# Patient Record
Sex: FEMALE | Race: BLACK OR AFRICAN AMERICAN | NOT HISPANIC OR LATINO | Employment: PART TIME | URBAN - METROPOLITAN AREA
[De-identification: names, ages, dates, MRNs, and addresses within clinical notes are randomized per-mention and may not be internally consistent; named-entity substitution may affect disease eponyms.]

---

## 2020-12-22 ENCOUNTER — HISTORICAL (OUTPATIENT)
Dept: ADMINISTRATIVE | Facility: HOSPITAL | Age: 25
End: 2020-12-22

## 2020-12-22 LAB
GLUCOSE 1H P 100 G GLC PO SERPL-MCNC: 144 MG/DL (ref 100–180)
HCT VFR BLD AUTO: 32.6 % (ref 37–47)
HGB BLD-MCNC: 10.4 GM/DL (ref 12–16)

## 2021-01-15 ENCOUNTER — HISTORICAL (OUTPATIENT)
Dept: ADMINISTRATIVE | Facility: HOSPITAL | Age: 26
End: 2021-01-15

## 2021-01-15 LAB
GLUCOSE 1H P 100 G GLC PO SERPL-MCNC: 152 MG/DL (ref 100–180)
GLUCOSE 2H P 100 G GLC PO SERPL-MCNC: 121 MG/DL (ref 70–140)
GLUCOSE 3H P 100 G GLC PO SERPL-MCNC: 85 MG/DL (ref 70–115)
GLUCOSE BS SERPL-MCNC: 72 MG/DL (ref 70–115)

## 2021-12-28 ENCOUNTER — HISTORICAL (OUTPATIENT)
Dept: ADMINISTRATIVE | Facility: HOSPITAL | Age: 26
End: 2021-12-28

## 2021-12-28 LAB
FLUAV AG UPPER RESP QL IA.RAPID: NEGATIVE
FLUBV AG UPPER RESP QL IA.RAPID: NEGATIVE
SARS-COV-2 RNA RESP QL NAA+PROBE: NEGATIVE

## 2022-04-11 ENCOUNTER — HISTORICAL (OUTPATIENT)
Dept: ADMINISTRATIVE | Facility: HOSPITAL | Age: 27
End: 2022-04-11

## 2022-04-27 VITALS
WEIGHT: 137.56 LBS | DIASTOLIC BLOOD PRESSURE: 75 MMHG | OXYGEN SATURATION: 100 % | BODY MASS INDEX: 20.85 KG/M2 | SYSTOLIC BLOOD PRESSURE: 108 MMHG | HEIGHT: 68 IN

## 2022-06-08 ENCOUNTER — OFFICE VISIT (OUTPATIENT)
Dept: GYNECOLOGY | Facility: CLINIC | Age: 27
End: 2022-06-08
Payer: MEDICAID

## 2022-06-08 VITALS
HEART RATE: 98 BPM | RESPIRATION RATE: 18 BRPM | HEIGHT: 68 IN | WEIGHT: 130.06 LBS | TEMPERATURE: 98 F | DIASTOLIC BLOOD PRESSURE: 75 MMHG | OXYGEN SATURATION: 98 % | SYSTOLIC BLOOD PRESSURE: 102 MMHG | BODY MASS INDEX: 19.71 KG/M2

## 2022-06-08 DIAGNOSIS — N93.9 ABNORMAL UTERINE BLEEDING: ICD-10-CM

## 2022-06-08 DIAGNOSIS — Z11.3 SCREEN FOR STD (SEXUALLY TRANSMITTED DISEASE): ICD-10-CM

## 2022-06-08 DIAGNOSIS — F43.9 STRESS AT HOME: Primary | ICD-10-CM

## 2022-06-08 LAB
C TRACH DNA SPEC QL NAA+PROBE: NOT DETECTED
N GONORRHOEA DNA SPEC QL NAA+PROBE: NOT DETECTED

## 2022-06-08 PROCEDURE — 87491 CHLMYD TRACH DNA AMP PROBE: CPT

## 2022-06-08 PROCEDURE — 99213 OFFICE O/P EST LOW 20 MIN: CPT | Mod: PBBFAC

## 2022-06-08 PROCEDURE — 87625 HPV TYPES 16 & 18 ONLY: CPT

## 2022-06-08 PROCEDURE — 87591 N.GONORRHOEAE DNA AMP PROB: CPT

## 2022-06-08 NOTE — PROGRESS NOTES
"    [facility] GYNECOLOGY CLINIC NOTE     Salome Pérez is a 26 y.o.  presenting to GYN clinic for Abnormal Uterine Bleeding x 1 month .      27 yo stated having abnormal uterine bleeding . LMP started on 05/10/22. Stated having bleed almost every day. No abnormal vaginal discharge. Right lower quadrant pain associated with period 1 x. Described as cramp like pain, which has resolved. Last pap smear was normal about 2 years ago. Denies dysuria. She is under a lot of stress currently. Taking care of her son and dealing with personal issues at home. Denies guilt, sadness, or loss of interest in daily activities.     Menarche at 14 yo  Gestational hx: 1 x pregnancy, via Vaginal delivery  No hx of birth control . Denies hx of STD's.  Sexually active with one male partner    PHQ-2 : negative    Gynecology  OB History        1    Para   1    Term           1    AB        Living   1       SAB        IAB        Ectopic        Multiple        Live Births   1                Past Medical History:   Diagnosis Date    Toxemia in pregnancy       History reviewed. No pertinent surgical history.   No current outpatient medications  Social History     Tobacco Use    Smoking status: Never Smoker    Smokeless tobacco: Current User   Substance Use Topics    Alcohol use: Yes     Comment: occassionally    Drug use: Never       Review of Systems  Pertinent items are noted in HPI.     Objective:     /75 (BP Location: Right arm)   Pulse 98   Temp 98.2 °F (36.8 °C) (Oral)   Resp 18   Ht 5' 8" (1.727 m)   Wt 59 kg (130 lb 1.1 oz)   LMP 05/15/2022 (Approximate)   SpO2 98%   BMI 19.78 kg/m²   Physical Exam:  Gen: Alert, cooperative, in no acute distress.  HEENT: EOMI, MMM  CV: rrr, no murmurs/rubs/gallops  Chest: ctabl, no wheezes/rales/rhonchi  Abdomen: Soft, non-tender, no masses.  Extrem: Extremities normal  External genitalia: Normal female genetalia without lesion, discharge or tenderness. "   Speculum Exam: Vaginal vault without discharge, nonodorous, no lesions/masses seen.  Mild bleeding secondary to menstrual cycle- still bleeding. Cervical os visualized as closed, no lesions/masses.   Bimanual Exam: No cervical motion tenderness.  Uterus freely mobile.  Normal size uterus. No adnexal masses.  Nontender exam.   Note: RN chaperone present for entirety of exam.    Relevant Labs:   none    Relevant Imaging:  none      Assessment:       26 y.o.  here for  Abnormal uterine bleeding.  1. Stress at home     2. Abnormal uterine bleeding  Chlamydia/GC, PCR    HIV 1/2 Ag/Ab (4th Gen)    SYPHILIS ANTIBODY (WITH REFLEX RPR)    Liquid-Based Pap Smear, Screening Screening   3. Screen for STD (sexually transmitted disease)          Plan:   - Pelvic exam performed. Pap smear, GC, HIV, Syphilis ordered for STD screening  -Urine pregnancy test is negative.  - Further imaging is not necessary currently as patient has no pertinent pelvic exam and this is 1 x occurrence of abnormal uterine bleeding.  - Discussed with the patient about dealing with stress at home . And stress could likely be contributing to her abnormal menstrual cycle for a month. Ovarian cyst/rupture could be another reason for abdominal pain last month.  - Offered birth control options, patient understood but declines contraception.  - She will return in a year with NP for Well Women's Exam       Problem List Items Addressed This Visit    None          Return to clinic PRN    Discussed patient and plan with NP follow up for 1 year Women's Wellness Exam

## 2022-06-15 LAB
HIGH RISK HPV 16 (PRECISION): NEGATIVE
HIGH RISK HPV 18/45 (PRECISION): NEGATIVE
INSULIN SERPL-ACNC: NORMAL U[IU]/ML
LAB AP BETHESDA CATEGORY: NORMAL
LAB AP GYN ADDITIONAL FINDINGS: NORMAL
LAB AP GYN MOLECULAR TESTING: NORMAL
LAB AP LMP DATE: NORMAL
LAB AP OCHS PAP SPECIMEN ADEQUACY: NORMAL
LAB AP OHS PAP INTERPRETATION: NORMAL
LAB AP PAP DISCLAIMER COMMENTS: NORMAL

## 2022-08-04 ENCOUNTER — HOSPITAL ENCOUNTER (EMERGENCY)
Facility: HOSPITAL | Age: 27
Discharge: HOME | End: 2022-08-04
Attending: EMERGENCY MEDICINE

## 2022-08-04 VITALS
TEMPERATURE: 99 F | WEIGHT: 139.56 LBS | SYSTOLIC BLOOD PRESSURE: 135 MMHG | DIASTOLIC BLOOD PRESSURE: 76 MMHG | RESPIRATION RATE: 17 BRPM | BODY MASS INDEX: 21.15 KG/M2 | HEIGHT: 68 IN | HEART RATE: 77 BPM | OXYGEN SATURATION: 100 %

## 2022-08-04 DIAGNOSIS — D64.9 ANEMIA, UNSPECIFIED TYPE: Primary | ICD-10-CM

## 2022-08-04 DIAGNOSIS — N93.9 ABNORMAL UTERINE BLEEDING: ICD-10-CM

## 2022-08-04 LAB
ALBUMIN SERPL-MCNC: 3.9 GM/DL (ref 3.5–5)
ALBUMIN/GLOB SERPL: 1.2 RATIO (ref 1.1–2)
ALP SERPL-CCNC: 50 UNIT/L (ref 40–150)
ALT SERPL-CCNC: 9 UNIT/L (ref 0–55)
AST SERPL-CCNC: 14 UNIT/L (ref 5–34)
B-HCG UR QL: NEGATIVE
BASOPHILS # BLD AUTO: 0.04 X10(3)/MCL (ref 0–0.2)
BASOPHILS NFR BLD AUTO: 0.6 %
BILIRUBIN DIRECT+TOT PNL SERPL-MCNC: 0.2 MG/DL
BUN SERPL-MCNC: 12.4 MG/DL (ref 7–18.7)
CALCIUM SERPL-MCNC: 9.5 MG/DL (ref 8.4–10.2)
CHLORIDE SERPL-SCNC: 107 MMOL/L (ref 98–107)
CO2 SERPL-SCNC: 27 MMOL/L (ref 22–29)
CREAT SERPL-MCNC: 0.69 MG/DL (ref 0.55–1.02)
CTP QC/QA: YES
EOSINOPHIL # BLD AUTO: 0.07 X10(3)/MCL (ref 0–0.9)
EOSINOPHIL NFR BLD AUTO: 1.1 %
ERYTHROCYTE [DISTWIDTH] IN BLOOD BY AUTOMATED COUNT: 12.3 % (ref 11.5–17)
GLOBULIN SER-MCNC: 3.2 GM/DL (ref 2.4–3.5)
GLUCOSE SERPL-MCNC: 107 MG/DL (ref 74–100)
HCT VFR BLD AUTO: 35.4 % (ref 37–47)
HGB BLD-MCNC: 11.6 GM/DL (ref 12–16)
IMM GRANULOCYTES # BLD AUTO: 0.01 X10(3)/MCL (ref 0–0.04)
IMM GRANULOCYTES NFR BLD AUTO: 0.2 %
LYMPHOCYTES # BLD AUTO: 2.93 X10(3)/MCL (ref 0.6–4.6)
LYMPHOCYTES NFR BLD AUTO: 46.9 %
MCH RBC QN AUTO: 27.7 PG (ref 27–31)
MCHC RBC AUTO-ENTMCNC: 32.8 MG/DL (ref 33–36)
MCV RBC AUTO: 84.5 FL (ref 80–94)
MONOCYTES # BLD AUTO: 0.53 X10(3)/MCL (ref 0.1–1.3)
MONOCYTES NFR BLD AUTO: 8.5 %
NEUTROPHILS # BLD AUTO: 2.7 X10(3)/MCL (ref 2.1–9.2)
NEUTROPHILS NFR BLD AUTO: 42.7 %
NRBC BLD AUTO-RTO: 0 %
PLATELET # BLD AUTO: 224 X10(3)/MCL (ref 130–400)
PMV BLD AUTO: 11 FL (ref 7.4–10.4)
POTASSIUM SERPL-SCNC: 4.2 MMOL/L (ref 3.5–5.1)
PROT SERPL-MCNC: 7.1 GM/DL (ref 6.4–8.3)
RBC # BLD AUTO: 4.19 X10(6)/MCL (ref 4.2–5.4)
SODIUM SERPL-SCNC: 139 MMOL/L (ref 136–145)
WBC # SPEC AUTO: 6.3 X10(3)/MCL (ref 4.5–11.5)

## 2022-08-04 PROCEDURE — 99283 EMERGENCY DEPT VISIT LOW MDM: CPT | Mod: 25

## 2022-08-04 PROCEDURE — 80053 COMPREHEN METABOLIC PANEL: CPT | Performed by: EMERGENCY MEDICINE

## 2022-08-04 PROCEDURE — 81025 URINE PREGNANCY TEST: CPT | Performed by: EMERGENCY MEDICINE

## 2022-08-04 PROCEDURE — 36415 COLL VENOUS BLD VENIPUNCTURE: CPT | Performed by: EMERGENCY MEDICINE

## 2022-08-04 PROCEDURE — 85025 COMPLETE CBC W/AUTO DIFF WBC: CPT | Performed by: EMERGENCY MEDICINE

## 2022-08-04 NOTE — Clinical Note
"Salome Finky" Beto was seen and treated in our emergency department on 8/4/2022.  She may return to work on 08/05/2022.       If you have any questions or concerns, please don't hesitate to call.      i[employer] CURI[facility]    "

## 2022-08-04 NOTE — ED PROVIDER NOTES
Encounter Date: 8/4/2022       History     Chief Complaint   Patient presents with    Weakness     Reports lumps on uterus and fatigue x4 days.      Vaginal bleeding; intermittently for several months. Has been seen by gynecology and is hesitant to accept OCP's. Is inclined toward possible myomectomy for fibroids. Patient essentially requesting links to gynecology, seeking reassurance that no hemomdynamically significant bleed has yet evolved given ongoing spotting, menorrhagia.    The history is provided by the patient.   Female  Problem  Primary symptoms include vaginal bleeding.   This is a recurrent problem. The current episode started several weeks ago. The problem occurs every several days. The problem has been waxing and waning. The symptoms occur spontaneously. She is not pregnant. Her LMP was weeks ago. The patient's menstrual history has been irregular. The discharge was bloody. Associated medical issues do not include STD, PID, vaginosis, gynecological surgery, ectopic pregnancy or terminated pregnancy.     Review of patient's allergies indicates:  No Known Allergies  Past Medical History:   Diagnosis Date    Toxemia in pregnancy      History reviewed. No pertinent surgical history.  Family History   Problem Relation Age of Onset    Cancer Paternal Grandmother     Hypertension Father     Diabetes Father      Social History     Tobacco Use    Smoking status: Never Smoker    Smokeless tobacco: Current User   Substance Use Topics    Alcohol use: Yes     Comment: occassionally    Drug use: Never     Review of Systems   Genitourinary: Positive for vaginal bleeding.   All other systems reviewed and are negative.      Physical Exam     Initial Vitals [08/04/22 0203]   BP Pulse Resp Temp SpO2   127/86 83 18 98.6 °F (37 °C) 99 %      MAP       --         Physical Exam    Nursing note and vitals reviewed.  Constitutional: She appears well-developed and well-nourished. She is not diaphoretic. No distress.    HENT:   Head: Normocephalic and atraumatic.   Eyes: EOM are normal. Pupils are equal, round, and reactive to light. Right eye exhibits no discharge. Left eye exhibits no discharge.   Neck: Neck supple. No thyromegaly present. No tracheal deviation present. No JVD present.   Normal range of motion.  Cardiovascular: Normal rate, regular rhythm, normal heart sounds and intact distal pulses. Exam reveals no gallop and no friction rub.    No murmur heard.  Pulmonary/Chest: Breath sounds normal. No stridor. No respiratory distress. She has no wheezes. She has no rhonchi. She has no rales.   Abdominal: Abdomen is soft. Bowel sounds are normal. She exhibits no distension and no mass. There is no abdominal tenderness. There is no rebound and no guarding.   Musculoskeletal:         General: No tenderness or edema. Normal range of motion.      Cervical back: Normal range of motion and neck supple.     Lymphadenopathy:     She has no cervical adenopathy.   Neurological: She is alert and oriented to person, place, and time. She has normal strength. She displays normal reflexes. No cranial nerve deficit or sensory deficit. GCS score is 15. GCS eye subscore is 4. GCS verbal subscore is 5. GCS motor subscore is 6.   Skin: Skin is warm and dry. Capillary refill takes less than 2 seconds. No rash and no abscess noted. No erythema. No pallor.   Psychiatric: She has a normal mood and affect. Her behavior is normal. Judgment and thought content normal.         ED Course   Procedures  Labs Reviewed   COMPREHENSIVE METABOLIC PANEL - Abnormal; Notable for the following components:       Result Value    Glucose Level 107 (*)     All other components within normal limits   CBC WITH DIFFERENTIAL - Abnormal; Notable for the following components:    RBC 4.19 (*)     Hgb 11.6 (*)     Hct 35.4 (*)     MCHC 32.8 (*)     MPV 11.0 (*)     All other components within normal limits   CBC W/ AUTO DIFFERENTIAL    Narrative:     The following orders  were created for panel order CBC auto differential.  Procedure                               Abnormality         Status                     ---------                               -----------         ------                     CBC with Differential[[id number]]        Abnormal            Final result                 Please view results for these tests on the individual orders.   EXTRA TUBES    Narrative:     The following orders were created for panel order EXTRA TUBES.  Procedure                               Abnormality         Status                     ---------                               -----------         ------                     Light Blue Top Hold[[id number]]                              In process                 Red Top Hold[[id number]]                                     In process                 Pink Top Hold[[id number]]                                    In process                   Please view results for these tests on the individual orders.   LIGHT BLUE TOP HOLD   RED TOP HOLD   PINK TOP HOLD   POCT URINE PREGNANCY          Imaging Results    None          Medications - No data to display  Medical Decision Making:   History:   Old Medical Records: I decided to obtain old medical records.  Old Records Summarized: other records.       <> Summary of Records: Previous records are compatible with history as reported by the patient, including recent visit with gynecology team where an oral contraceptive pills recommended, and patient hesitant to accept OCPs.  Initial Assessment:   Patient presents endorsing ongoing intermittent vaginal spotting and intermittent menorrhagia since the delivery of her child.  She reports that her periods had previously been regular.  She observes that her periods have become less regular since the birth of her child.  This pattern is also described in the recent gynecology note.  Patient correctly recalls the gynecology team had raised the possibility that recent stressors  were related to her irregularity.  She notes now that stressors have considerably resolves, but.  Continue to be irregular, and accompanied by intermittent spotting.  She arrives with a reassuring physical exam and normal hemodynamic parameters.  She comprehends that definitive care likely includes treatment with a gynecologist, but expresses concern with the ongoing bleeding, and is essentially requesting lab data to diminish the probability of a hemodynamically significant bleed.  Differential Diagnosis:   Menorrhagia, pregnancy, leiomyoma, dysfunctional uterine bleeding  Clinical Tests:   Lab Tests: Ordered and Reviewed  ED Management:  Clinical course in the emergency department remains reassuring.  Patient at no point develops symptoms or signs of significant bleed, anemia.  The lab data resulted, and found reassuring.  Patient is reassured by the normal data, and comprehends plan follow-up gynecology clinic.  Patient is discharged home in stable condition with anticipatory guidance, return precautions, follow-up instructions.  Discharged in stable condition without event.             ED Course as of 08/04/22 0322   Thu Aug 04, 2022   0301 Negative upt ; [CT]   0319 Reassuring hemogram ; [CT]   0319 Reassuring chemistries [CT]      ED Course User Index  [CT] Ryan Nolasco MD             Clinical Impression:   Final diagnoses:  [D64.9] Anemia, unspecified type (Primary)  [N93.9] Abnormal uterine bleeding          ED Disposition Condition    Discharge Stable        ED Prescriptions     None        Follow-up Information    None          Ryan Nolasco MD  08/04/22 0323

## 2022-11-28 ENCOUNTER — OFFICE VISIT (OUTPATIENT)
Dept: GYNECOLOGY | Facility: CLINIC | Age: 27
End: 2022-11-28
Payer: MEDICAID

## 2022-11-28 VITALS
DIASTOLIC BLOOD PRESSURE: 71 MMHG | OXYGEN SATURATION: 98 % | RESPIRATION RATE: 20 BRPM | TEMPERATURE: 98 F | SYSTOLIC BLOOD PRESSURE: 116 MMHG | HEART RATE: 81 BPM

## 2022-11-28 DIAGNOSIS — D64.9 ANEMIA, UNSPECIFIED TYPE: ICD-10-CM

## 2022-11-28 DIAGNOSIS — N72 CERVICITIS: Primary | ICD-10-CM

## 2022-11-28 DIAGNOSIS — N93.9 ABNORMAL UTERINE BLEEDING: ICD-10-CM

## 2022-11-28 LAB
C TRACH DNA SPEC QL NAA+PROBE: NOT DETECTED
CLUE CELLS VAG QL WET PREP: ABNORMAL
N GONORRHOEA DNA SPEC QL NAA+PROBE: NOT DETECTED
T VAGINALIS VAG QL WET PREP: ABNORMAL
WBC #/AREA VAG WET PREP: ABNORMAL
YEAST SPEC QL WET PREP: ABNORMAL

## 2022-11-28 PROCEDURE — 99213 OFFICE O/P EST LOW 20 MIN: CPT | Mod: PBBFAC,25

## 2022-11-28 PROCEDURE — 87491 CHLMYD TRACH DNA AMP PROBE: CPT

## 2022-11-28 PROCEDURE — 96372 THER/PROPH/DIAG INJ SC/IM: CPT | Mod: PBBFAC

## 2022-11-28 PROCEDURE — 87210 SMEAR WET MOUNT SALINE/INK: CPT

## 2022-11-28 PROCEDURE — 87591 N.GONORRHOEAE DNA AMP PROB: CPT

## 2022-11-28 RX ORDER — DOXYCYCLINE 100 MG/1
100 CAPSULE ORAL 2 TIMES DAILY
Qty: 14 CAPSULE | Refills: 0 | Status: SHIPPED | OUTPATIENT
Start: 2022-11-28 | End: 2022-12-05

## 2022-11-28 RX ORDER — CEFTRIAXONE 500 MG/1
500 INJECTION, POWDER, FOR SOLUTION INTRAMUSCULAR; INTRAVENOUS
Status: COMPLETED | OUTPATIENT
Start: 2022-11-28 | End: 2022-11-28

## 2022-11-28 RX ORDER — AZITHROMYCIN 250 MG/1
1000 TABLET, FILM COATED ORAL
Status: COMPLETED | OUTPATIENT
Start: 2022-11-28 | End: 2022-11-28

## 2022-11-28 RX ORDER — AZITHROMYCIN 1 G/1
1 POWDER, FOR SUSPENSION ORAL
Status: DISCONTINUED | OUTPATIENT
Start: 2022-11-28 | End: 2022-11-28

## 2022-11-28 RX ADMIN — CEFTRIAXONE SODIUM 500 MG: 250 INJECTION, POWDER, FOR SOLUTION INTRAMUSCULAR; INTRAVENOUS at 11:11

## 2022-11-28 RX ADMIN — AZITHROMYCIN 1000 MG: 250 TABLET, FILM COATED ORAL at 11:11

## 2022-11-28 NOTE — PROGRESS NOTES
[facility] GYNECOLOGY CLINIC NOTE     Salome Pérez is a 26 y.o.  presenting to GYN clinic for presents with pelvic pain over the last week.  She feels pelvic pressure with concern for constipation.  Last BM 5 days ago.LMP finished last week.  She denies any vaginal discharge or new sexual partners.  She is .  Denies Hx STDs, dysuria      Gynecology  OB History          1    Para   1    Term           1    AB        Living   1         SAB        IAB        Ectopic        Multiple        Live Births   1                Past Medical History:   Diagnosis Date    Toxemia in pregnancy       History reviewed. No pertinent surgical history.     Social History     Tobacco Use    Smoking status: Never    Smokeless tobacco: Never   Substance Use Topics    Alcohol use: Yes     Comment: occassionally    Drug use: Never       Review of Systems  Pertinent items are noted in HPI.     Objective:     /71 (BP Location: Right arm, Patient Position: Sitting, BP Method: Medium (Automatic))   Pulse 81   Temp 97.6 °F (36.4 °C) (Oral)   Resp 20   LMP 2022   SpO2 98%   Physical Exam:  Gen: Well-nourished, well-developed female appearing stated age. Alert, cooperative, in no acute distress.  Abdomen: Soft, non-tender, no masses. no rebound/guarding  Extrem: Extremities normal, atraumatic, non-tender calves.  External genitalia: Normal female genetalia without lesion, discharge or tenderness. I  Speculum Exam: Vaginal vault discharge, nonodorous, no lesions/masses seen.  Cervical os visualized as closed, no lesions/masses.  Friable - wet prep and GC/CT done  Bimanual Exam: No cervical motion tenderness.  Uterus freely mobile but tender. Normal size uterus. No adnexal masses  Note: RN chaperone present for entirety of exam.      Assessment:       26 y.o.  here for:  1. Cervicitis  Chlamydia/GC, PCR    Wet Prep, Genital    cefTRIAXone injection 500 mg    doxycycline (MONODOX) 100 MG  capsule    cefTRIAXone (ROCEPHIN) 250 mg injection    LIDOcaine 1% (BUFFERED) 10 MG/20 mL    DISCONTINUED: azithromycin powder 1 g      2. Anemia, unspecified type  Ambulatory referral/consult to Gynecology      3. Abnormal uterine bleeding  Ambulatory referral/consult to Gynecology             Plan:         Problem List Items Addressed This Visit          Renal/    Abnormal uterine bleeding    Cervicitis - Primary     Rocephin and azithromycin today with Doxycycline Rx sent - bid x 7 days  Testing today for GC/CT  No signs of acute abdomen         Relevant Medications    cefTRIAXone injection 500 mg    doxycycline (MONODOX) 100 MG capsule    Other Relevant Orders    Chlamydia/GC, PCR    Wet Prep, Genital     Other Visit Diagnoses       Anemia, unspecified type              I will contact the patient with her results.

## 2022-11-28 NOTE — ASSESSMENT & PLAN NOTE
Rocephin and azithromycin today with Doxycycline Rx sent - bid x 7 days  Testing today for GC/CT  No signs of acute abdomen

## 2022-12-02 DIAGNOSIS — R11.2 NAUSEA AND VOMITING, UNSPECIFIED VOMITING TYPE: Primary | ICD-10-CM

## 2022-12-02 RX ORDER — ONDANSETRON 4 MG/1
4 TABLET, ORALLY DISINTEGRATING ORAL 2 TIMES DAILY
Qty: 20 TABLET | Refills: 3 | Status: SHIPPED | OUTPATIENT
Start: 2022-12-02 | End: 2023-10-05

## 2023-01-10 ENCOUNTER — OFFICE VISIT (OUTPATIENT)
Dept: GYNECOLOGY | Facility: CLINIC | Age: 28
End: 2023-01-10
Payer: MEDICAID

## 2023-01-10 ENCOUNTER — TELEPHONE (OUTPATIENT)
Dept: GYNECOLOGY | Facility: CLINIC | Age: 28
End: 2023-01-10
Payer: MEDICAID

## 2023-01-10 VITALS
TEMPERATURE: 99 F | DIASTOLIC BLOOD PRESSURE: 73 MMHG | BODY MASS INDEX: 22.73 KG/M2 | WEIGHT: 150 LBS | OXYGEN SATURATION: 99 % | SYSTOLIC BLOOD PRESSURE: 108 MMHG | HEART RATE: 92 BPM | HEIGHT: 68 IN | RESPIRATION RATE: 18 BRPM

## 2023-01-10 DIAGNOSIS — N89.8 VAGINAL IRRITATION: Primary | ICD-10-CM

## 2023-01-10 DIAGNOSIS — N89.8 VAGINAL DISCHARGE: ICD-10-CM

## 2023-01-10 PROCEDURE — 3074F SYST BP LT 130 MM HG: CPT | Mod: CPTII,,, | Performed by: NURSE PRACTITIONER

## 2023-01-10 PROCEDURE — 1159F PR MEDICATION LIST DOCUMENTED IN MEDICAL RECORD: ICD-10-PCS | Mod: CPTII,,, | Performed by: NURSE PRACTITIONER

## 2023-01-10 PROCEDURE — 3074F PR MOST RECENT SYSTOLIC BLOOD PRESSURE < 130 MM HG: ICD-10-PCS | Mod: CPTII,,, | Performed by: NURSE PRACTITIONER

## 2023-01-10 PROCEDURE — 99214 OFFICE O/P EST MOD 30 MIN: CPT | Mod: PBBFAC | Performed by: NURSE PRACTITIONER

## 2023-01-10 PROCEDURE — 3008F BODY MASS INDEX DOCD: CPT | Mod: CPTII,,, | Performed by: NURSE PRACTITIONER

## 2023-01-10 PROCEDURE — 3078F PR MOST RECENT DIASTOLIC BLOOD PRESSURE < 80 MM HG: ICD-10-PCS | Mod: CPTII,,, | Performed by: NURSE PRACTITIONER

## 2023-01-10 PROCEDURE — 87591 N.GONORRHOEAE DNA AMP PROB: CPT | Performed by: NURSE PRACTITIONER

## 2023-01-10 PROCEDURE — 87491 CHLMYD TRACH DNA AMP PROBE: CPT | Performed by: NURSE PRACTITIONER

## 2023-01-10 PROCEDURE — 99213 OFFICE O/P EST LOW 20 MIN: CPT | Mod: S$PBB,,, | Performed by: NURSE PRACTITIONER

## 2023-01-10 PROCEDURE — 1160F RVW MEDS BY RX/DR IN RCRD: CPT | Mod: CPTII,,, | Performed by: NURSE PRACTITIONER

## 2023-01-10 PROCEDURE — 99213 PR OFFICE/OUTPT VISIT, EST, LEVL III, 20-29 MIN: ICD-10-PCS | Mod: S$PBB,,, | Performed by: NURSE PRACTITIONER

## 2023-01-10 PROCEDURE — 87210 SMEAR WET MOUNT SALINE/INK: CPT | Performed by: NURSE PRACTITIONER

## 2023-01-10 PROCEDURE — 1160F PR REVIEW ALL MEDS BY PRESCRIBER/CLIN PHARMACIST DOCUMENTED: ICD-10-PCS | Mod: CPTII,,, | Performed by: NURSE PRACTITIONER

## 2023-01-10 PROCEDURE — 3078F DIAST BP <80 MM HG: CPT | Mod: CPTII,,, | Performed by: NURSE PRACTITIONER

## 2023-01-10 PROCEDURE — 1159F MED LIST DOCD IN RCRD: CPT | Mod: CPTII,,, | Performed by: NURSE PRACTITIONER

## 2023-01-10 PROCEDURE — 3008F PR BODY MASS INDEX (BMI) DOCUMENTED: ICD-10-PCS | Mod: CPTII,,, | Performed by: NURSE PRACTITIONER

## 2023-01-10 RX ORDER — DOXYCYCLINE HYCLATE 100 MG
100 TABLET ORAL 2 TIMES DAILY
Qty: 14 TABLET | Refills: 0 | Status: SHIPPED | OUTPATIENT
Start: 2023-01-10 | End: 2023-01-17

## 2023-01-10 RX ORDER — TRIAMCINOLONE ACETONIDE 1 MG/G
CREAM TOPICAL 2 TIMES DAILY
Qty: 30 G | Refills: 0 | Status: SHIPPED | OUTPATIENT
Start: 2023-01-10 | End: 2023-01-17

## 2023-01-10 NOTE — PROGRESS NOTES
"  Subjective:       Patient ID: Salome Pérez is a 27 y.o. female.    Chief Complaint:  No chief complaint on file.      History of Present Illness  Pt presents today for vaginal irritation x3 days. States  looked and has cut with bleeding to labia. Pt denies scratching. Use Dove unscented soap and Extra washing powder. Denies any recent changes in products. Admits to vaginal discharge noted. Treated for cervicitis in 2022, treated with Rocephin and Doxycycline. Denies any new sex partners.    GYN & OB History  Patient's last menstrual period was 12/15/2022 (exact date).   Date of Last Pap: 6/15/2022    Review of patient's allergies indicates:  No Known Allergies    OB History    Para Term  AB Living   1 1   1   1   SAB IAB Ectopic Multiple Live Births           1      # Outcome Date GA Lbr Matthew/2nd Weight Sex Delivery Anes PTL Lv   1      M   Y XIN      Complications: Toxemia in pregnancy, Hypertensive disease arising during pregnancy        Review of Systems  Review of Systems    Negative except for pertinent findings for positives per HPI     Objective:    Physical Exam    /73 (BP Location: Left arm, Patient Position: Sitting, BP Method: Medium (Automatic))   Pulse 92   Temp 98.6 °F (37 °C) (Oral)   Resp 18   Ht 5' 8" (1.727 m)   Wt 68 kg (150 lb)   LMP 12/15/2022 (Exact Date)   SpO2 99%   BMI 22.81 kg/m²   GENERAL: Well-developed female in no acute distress.  SKIN: Normal to inspection, warm and intact.  VULVA: General appearance normal; external genitalia with appearance of scratch marks to Lt inner labia majora.  VAGINA: Mucosa normal, pink, mucopurulent/yellow tinged discharge.  CERVIX: Friable, bleeding upon touching cervix with swab.  BIMANUAL EXAM: reveals a 6 week-sized uterus. The uterus is non tender. Jorge adnexa reveal no tenderness.  PSYCHIATRIC: Patient is oriented to person, place, and time. Mood and affect are normal.    Assessment:       1. " Vaginal irritation    2. Vaginal discharge      Plan:   Diagnoses and all orders for this visit:    Vaginal irritation  -     triamcinolone acetonide 0.1% (KENALOG) 0.1 % cream; Apply topically 2 (two) times daily. Apply small amount to affected external area. for 7 days    Vaginal discharge  -     Chlamydia/GC, PCR  -     Wet Prep, Genital  -     Liquid-Based Pap Smear, Screening Screening  -     doxycycline (VIBRA-TABS) 100 MG tablet; Take 1 tablet (100 mg total) by mouth 2 (two) times daily. Drink plenty of fluids. Take with food to minimize abdominal discomfort. for 7 days  -     HSV 1 & 2, IgG; Future  -     SYPHILIS ANTIBODY (WITH REFLEX RPR); Future     Wet prep and g/c all negative for infections. Will treat for cervicitis based on symptoms above. Will treat with Doxycycline and have pt f/u in 2 weeks and possibly recommend GYN eval again.  Triamcinolone to vaginal irritation, externally x7 days. Appears to be scratch marks.  HSV and syphilis blood testing  Pap ordered d/t symptoms  Follow up for annual exam.

## 2023-01-18 LAB
INSULIN SERPL-ACNC: NORMAL U[IU]/ML
LAB AP BETHESDA CATEGORY: NORMAL
LAB AP CLINICAL FINDINGS: NORMAL
LAB AP CONTRACEPTIVES: NORMAL
LAB AP GYN ADDITIONAL FINDINGS: NORMAL
LAB AP GYN MOLECULAR TESTING: NORMAL
LAB AP LMP DATE: NORMAL
LAB AP OCHS PAP SPECIMEN ADEQUACY: NORMAL
LAB AP OHS PAP INTERPRETATION: NORMAL
LAB AP PAP DISCLAIMER COMMENTS: NORMAL
LAB AP PAP ESTROGEN REPLACEMENT THERAPY: NORMAL
LAB AP PAP PMP: NORMAL
LAB AP PAP PREVIOUS BX: NORMAL
LAB AP PAP PRIOR TREATMENT: NORMAL

## 2023-10-05 ENCOUNTER — OFFICE VISIT (OUTPATIENT)
Dept: GYNECOLOGY | Facility: CLINIC | Age: 28
End: 2023-10-05
Payer: MEDICAID

## 2023-10-05 VITALS
SYSTOLIC BLOOD PRESSURE: 118 MMHG | BODY MASS INDEX: 25.52 KG/M2 | OXYGEN SATURATION: 100 % | RESPIRATION RATE: 20 BRPM | TEMPERATURE: 99 F | WEIGHT: 168.38 LBS | HEIGHT: 68 IN | DIASTOLIC BLOOD PRESSURE: 79 MMHG | HEART RATE: 88 BPM

## 2023-10-05 DIAGNOSIS — N76.1 DESQUAMATIVE INFLAMMATORY VAGINITIS: Primary | ICD-10-CM

## 2023-10-05 DIAGNOSIS — Z76.89 ESTABLISHING CARE WITH NEW DOCTOR, ENCOUNTER FOR: ICD-10-CM

## 2023-10-05 DIAGNOSIS — N89.8 VAGINAL DISCHARGE: ICD-10-CM

## 2023-10-05 DIAGNOSIS — Z01.419 ENCOUNTER FOR ANNUAL ROUTINE GYNECOLOGICAL EXAMINATION: Primary | ICD-10-CM

## 2023-10-05 LAB
C TRACH DNA SPEC QL NAA+PROBE: NOT DETECTED
CLUE CELLS VAG QL WET PREP: ABNORMAL
N GONORRHOEA DNA SPEC QL NAA+PROBE: NOT DETECTED
SOURCE (OHS): NORMAL
T VAGINALIS VAG QL WET PREP: ABNORMAL
WBC #/AREA VAG WET PREP: ABNORMAL
YEAST SPEC QL WET PREP: ABNORMAL

## 2023-10-05 PROCEDURE — 87491 CHLMYD TRACH DNA AMP PROBE: CPT | Performed by: NURSE PRACTITIONER

## 2023-10-05 PROCEDURE — 99395 PREV VISIT EST AGE 18-39: CPT | Mod: S$PBB,,, | Performed by: NURSE PRACTITIONER

## 2023-10-05 PROCEDURE — 99395 PR PREVENTIVE VISIT,EST,18-39: ICD-10-PCS | Mod: S$PBB,,, | Performed by: NURSE PRACTITIONER

## 2023-10-05 PROCEDURE — 1159F PR MEDICATION LIST DOCUMENTED IN MEDICAL RECORD: ICD-10-PCS | Mod: CPTII,,, | Performed by: NURSE PRACTITIONER

## 2023-10-05 PROCEDURE — 1159F MED LIST DOCD IN RCRD: CPT | Mod: CPTII,,, | Performed by: NURSE PRACTITIONER

## 2023-10-05 PROCEDURE — 3074F PR MOST RECENT SYSTOLIC BLOOD PRESSURE < 130 MM HG: ICD-10-PCS | Mod: CPTII,,, | Performed by: NURSE PRACTITIONER

## 2023-10-05 PROCEDURE — 99213 OFFICE O/P EST LOW 20 MIN: CPT | Mod: PBBFAC | Performed by: NURSE PRACTITIONER

## 2023-10-05 PROCEDURE — 3008F PR BODY MASS INDEX (BMI) DOCUMENTED: ICD-10-PCS | Mod: CPTII,,, | Performed by: NURSE PRACTITIONER

## 2023-10-05 PROCEDURE — 87210 SMEAR WET MOUNT SALINE/INK: CPT | Performed by: NURSE PRACTITIONER

## 2023-10-05 PROCEDURE — 3074F SYST BP LT 130 MM HG: CPT | Mod: CPTII,,, | Performed by: NURSE PRACTITIONER

## 2023-10-05 PROCEDURE — 3078F PR MOST RECENT DIASTOLIC BLOOD PRESSURE < 80 MM HG: ICD-10-PCS | Mod: CPTII,,, | Performed by: NURSE PRACTITIONER

## 2023-10-05 PROCEDURE — 3078F DIAST BP <80 MM HG: CPT | Mod: CPTII,,, | Performed by: NURSE PRACTITIONER

## 2023-10-05 PROCEDURE — 87591 N.GONORRHOEAE DNA AMP PROB: CPT | Performed by: NURSE PRACTITIONER

## 2023-10-05 PROCEDURE — 3008F BODY MASS INDEX DOCD: CPT | Mod: CPTII,,, | Performed by: NURSE PRACTITIONER

## 2023-10-05 NOTE — LETTER
October 5, 2023      [facility]  [address] [address]  Phone: [phone]       Patient: Salome Pérez   YOB: 1995  Date of Visit: 10/05/2023    To Whom It May Concern:    Miles Pérez  was at [facility] on 10/05/2023. The patient may return to work/school on 10/05/2023 with no restrictions. If you have any questions or concerns, or if I can be of further assistance, please do not hesitate to contact me.    Sincerely,    Al Zamudio NP

## 2023-10-05 NOTE — PROGRESS NOTES
"  Subjective:       Patient ID: Salome Pérez is a 27 y.o. female.    Chief Complaint:  Gynecologic Exam    History of Present Illness  The patient  here for annual exam. Her LMP was 23. Period last 4-7 days and changes pads 5-6x/day for hygiene. Denies history of abnormal paps. Last pap -NIL and HPV neg. Denies breast or urinary complaints. Denies pelvic pain, abnormal bleeding. Occ vaginal discharge. Pt reports no STIs in the past and no concerns. HPV vaccinated. Currently not on contraception and declines. Denies tobacco use. Dep. screening 0. Denies fly hx of breast, ovarian, uterine or colon cancer.     GYN & OB History  Patient's last menstrual period was 2023 (exact date).   Date of Last Pap: 2023    Review of patient's allergies indicates:  No Known Allergies  Past Medical History:   Diagnosis Date    Anemia     Toxemia in pregnancy      OB History    Para Term  AB Living   1 1   1   1   SAB IAB Ectopic Multiple Live Births           1      # Outcome Date GA Lbr Matthew/2nd Weight Sex Delivery Anes PTL Lv   1      M   Y XIN      Complications: Toxemia in pregnancy, Hypertensive disease arising during pregnancy        Review of Systems  Review of Systems    Negative except for pertinent findings for positives per HPI     Objective:    Physical Exam    /79 (BP Location: Left arm, Patient Position: Sitting, BP Method: Medium (Automatic))   Pulse 88   Temp 98.9 °F (37.2 °C) (Oral)   Resp 20   Ht 5' 8" (1.727 m)   Wt 76.4 kg (168 lb 6.4 oz)   LMP 2023 (Exact Date)   SpO2 100%   BMI 25.61 kg/m²   GENERAL: Well-developed female. No acute distress.    SKIN: Normal to inspection, warm and intact.  BREASTS: No rashes or erythema. No masses, lumps, discharge, tenderness.  ABDOMEN: Soft, non tender.  VULVA: General appearance normal; external genitalia with no lesions or erythema.  BLADDER: No tenderness.  VAGINA: Mucosa/vaginal vault mild " erythema, no abnormal discharge or lesions.  CERVIX: Erythematous, parous appearing os, yellow tinged discharge.  BIMANUAL EXAM: reveals a 6 week-sized uterus. The uterus is regular, mobile, and non tender. Jorge adnexa reveal no evidence of masses, tenderness.  PSYCHIATRIC: Patient is oriented to person, place, and time. Mood and affect are normal.    Assessment:       1. Encounter for annual routine gynecological examination    2. Vaginal discharge    3. Establishing care with new doctor, encounter for       Plan:   Salome was seen today for gynecologic exam.    Diagnoses and all orders for this visit:    Encounter for annual routine gynecological examination    Vaginal discharge  -     Chlamydia/GC, PCR  -     Wet Prep, Genital    Establishing care with new doctor, encounter for  -     Ambulatory referral/consult to Family Practice; Future    Pelvic today, pap utd per ACOG  Wet prep and g/c  PCP referral  Follow up in about 1 year (around 10/5/2024) for annual exam.

## 2025-01-10 ENCOUNTER — OFFICE VISIT (OUTPATIENT)
Dept: GYNECOLOGY | Facility: CLINIC | Age: 30
End: 2025-01-10
Payer: MEDICAID

## 2025-01-10 VITALS
HEART RATE: 95 BPM | WEIGHT: 164 LBS | TEMPERATURE: 99 F | BODY MASS INDEX: 24.86 KG/M2 | DIASTOLIC BLOOD PRESSURE: 80 MMHG | HEIGHT: 68 IN | OXYGEN SATURATION: 100 % | SYSTOLIC BLOOD PRESSURE: 117 MMHG | RESPIRATION RATE: 20 BRPM

## 2025-01-10 DIAGNOSIS — Z11.3 ROUTINE SCREENING FOR STI (SEXUALLY TRANSMITTED INFECTION): ICD-10-CM

## 2025-01-10 DIAGNOSIS — Z01.419 ENCOUNTER FOR ANNUAL ROUTINE GYNECOLOGICAL EXAMINATION: Primary | ICD-10-CM

## 2025-01-10 PROCEDURE — 99213 OFFICE O/P EST LOW 20 MIN: CPT | Mod: PBBFAC | Performed by: NURSE PRACTITIONER

## 2025-01-10 PROCEDURE — 87591 N.GONORRHOEAE DNA AMP PROB: CPT | Performed by: NURSE PRACTITIONER

## 2025-01-10 PROCEDURE — 87210 SMEAR WET MOUNT SALINE/INK: CPT | Performed by: NURSE PRACTITIONER

## 2025-01-10 NOTE — PROGRESS NOTES
"  [facility] -  Gynecology / Women's Health Clinic      Subjective:       Patient ID: Salome Pérez is a 29 y.o. female.    Chief Complaint:  Gynecologic Exam    History of Present Illness  The patient  here for annual exam. Her LMP was 24. Period last 5 days and changes pads 5-6x/day for hygiene. Hx of heavy cycles. Denies history of abnormal paps. Last pap -NIL and HPV neg. Denies breast or urinary complaints. Denies pelvic pain, abnormal bleeding. Occ vaginal discharge. Pt reports no STIs in the past and desires testing. HPV vaccinated. Currently not on contraception and declines. Denies tobacco use. Dep. screening 0. Denies fly hx of breast, ovarian, uterine or colon cancer.      GYN & OB History  Patient's last menstrual period was 2024.   Date of Last Pap: 2023    Review of patient's allergies indicates:  No Known Allergies  Past Medical History:   Diagnosis Date    Anemia     Toxemia in pregnancy      OB History    Para Term  AB Living   1 1   1   1   SAB IAB Ectopic Multiple Live Births           1      # Outcome Date GA Lbr Matthew/2nd Weight Sex Type Anes PTL Lv   1      M   Y XIN      Complications: Toxemia in pregnancy, Hypertensive disease arising during pregnancy        Review of Systems  Review of Systems    Negative except for pertinent findings for positives per HPI     Objective:    Physical Exam    /80 (BP Location: Left arm, Patient Position: Sitting)   Pulse 95   Temp 98.6 °F (37 °C) (Oral)   Resp 20   Ht 5' 8" (1.727 m)   Wt 74.4 kg (164 lb)   LMP 2024   SpO2 100%   BMI 24.94 kg/m²   GENERAL: Well-developed female. No acute distress.    SKIN: Normal to inspection, warm and intact.  BREASTS: No rashes or erythema. No masses, lumps, discharge, tenderness.  VULVA: General appearance normal; external genitalia with no lesions or erythema.  VAGINA: Mucosa/vaginal vault pink, no abnormal discharge or " lesions.  CERVIX: Mild erythema, parous appearing os, friable, thin white discharge.  BIMANUAL EXAM: reveals a 8 week-sized uterus. The uterus is non tender. Jorge adnexa reveal no tenderness.  PSYCHIATRIC: Patient is oriented to person, place, and time. Mood and affect are normal.    Assessment:         ICD-10-CM ICD-9-CM   1. Encounter for annual routine gynecological examination  Z01.419 V72.31   2. Routine screening for STI (sexually transmitted infection)  Z11.3 V74.5     Plan:   Salome was seen today for gynecologic exam.    Diagnoses and all orders for this visit:    Encounter for annual routine gynecological examination    Routine screening for STI (sexually transmitted infection)  -     Chlamydia/GC, PCR  -     Wet Prep, Genital  -     HIV 1/2 Ag/Ab (4th Gen); Future  -     Hepatitis C Antibody; Future  -     Hepatitis B Surface Antigen; Future  -     SYPHILIS ANTIBODY (WITH REFLEX RPR); Future    Pelvic today, pap utd per ACOG  STI screening  Follow up in about 1 year (around 1/10/2026) for annual exam.

## 2025-04-01 ENCOUNTER — TELEPHONE (OUTPATIENT)
Dept: GYNECOLOGY | Facility: CLINIC | Age: 30
End: 2025-04-01
Payer: MEDICAID

## 2025-04-01 NOTE — TELEPHONE ENCOUNTER
Spoke to patient to instruct that she should monitor to see if cycle will come and if it does not to contact the clinic. Also to do a home UPT. Patient verbalized understanding.    ----- Message from Luz sent at 4/1/2025  1:03 PM CDT -----  Regarding: Patient Call  The patient above called because she would like to speak to someone about a missed cycle in March, she stated that she is not on any birth control and is concerned. Please give her a call, Thanks.

## 2025-05-07 ENCOUNTER — OFFICE VISIT (OUTPATIENT)
Dept: FAMILY MEDICINE | Facility: CLINIC | Age: 30
End: 2025-05-07
Payer: MEDICAID

## 2025-05-07 VITALS
HEIGHT: 68 IN | BODY MASS INDEX: 26.83 KG/M2 | RESPIRATION RATE: 18 BRPM | DIASTOLIC BLOOD PRESSURE: 74 MMHG | SYSTOLIC BLOOD PRESSURE: 114 MMHG | WEIGHT: 177 LBS | TEMPERATURE: 98 F | HEART RATE: 104 BPM | OXYGEN SATURATION: 97 %

## 2025-05-07 DIAGNOSIS — N92.6 MISSED PERIOD: Primary | ICD-10-CM

## 2025-05-07 DIAGNOSIS — Z32.01 PREGNANCY TEST POSITIVE: ICD-10-CM

## 2025-05-07 LAB
B-HCG UR QL: POSITIVE
CTP QC/QA: YES

## 2025-05-07 PROCEDURE — 99213 OFFICE O/P EST LOW 20 MIN: CPT | Mod: PBBFAC

## 2025-05-07 RX ORDER — B-COMPLEX WITH VITAMIN C
1 TABLET ORAL DAILY
Qty: 90 TABLET | Refills: 3 | Status: SHIPPED | OUTPATIENT
Start: 2025-05-07 | End: 2025-05-07

## 2025-05-07 RX ORDER — B-COMPLEX WITH VITAMIN C
1 TABLET ORAL DAILY
Qty: 90 TABLET | Refills: 3 | Status: SHIPPED | OUTPATIENT
Start: 2025-05-07 | End: 2025-05-08 | Stop reason: SDUPTHER

## 2025-05-07 NOTE — PROGRESS NOTES
"[facility] Clinic Note    Subjective:     Patient ID: aSlome Pérez is a 29 y.o. female    Chief Complaint:   Chief Complaint   Patient presents with    Follow-up     Patient states no complaints       HPI  Salome Pérez is a 29 y.o. female who presents with positive home pregnancy test.    Positive Pregnancy Test  -Positive home pregnancy test x3  -  -LMP: 2025. Pt reports not seeing cycle since then, was under the impression she had fibroid  -Papsmear: Most recent 1/10/2023 - NILM, -ve HRHPV, denies any abnormal  -STD: Denies any ever, GC/Cl -ve (1/10/2023)  -Medical hx: Denies  -Medication: Denies  -Symptoms: Denies N/V.        Current Outpatient Medications   Medication Instructions    prenatal vit no.130-iron-folic (PRENATAL VITAMIN) 27 mg iron- 800 mcg Tab 1 tablet, Oral, Daily       Review of Systems  As per HPI    Objective:         10/5/2023    10:00 AM 1/10/2025     8:24 AM 2025     3:00 PM   Vitals - 1 value per visit   SYSTOLIC 118 117 114   DIASTOLIC 79 80 74   Pulse 88 95 104   Temp 98.9 °F (37.2 °C) 98.6 °F (37 °C) 98 °F (36.7 °C)   Resp 20 20 18   SPO2 100 % 100 % 97 %   Weight (lb) 168.4 164 177   Weight (kg) 76.386 74.39 80.287   Height 5' 8" (1.727 m) 5' 8" (1.727 m) 5' 8" (1.727 m)   BMI (Calculated) 25.6 24.9 26.9     Physical Exam  Gen: No acute distress  CV: RRR, no murmurs. No LE edema.  Resp: CTAB, breathing non labored on room air.  Abd: Soft, non-distended, non-tender, bowel sounds normoactive.  MSK: ambulating spontaneously with appropriate ROM in all extremities.  Psych: Euthymic. Normal affect.    Assessment & Plan     Assessment & Plan  1. Missed period    2. Pregnancy test positive      Plan:  -UPT positive in office  -Unsure of exact date of start of LMP ( approx 6- 8 weeks gestation)  -Pt without any concerns today, no PMHx, no medication  -PNV daily, Rx sent to pharmacy of choice  -List of medications safe to take not to take given to " patient  -Will schedule for initial OB visit. Messaged Ms. Olmedo for scheduling.         Orders Placed This Encounter    POCT urine pregnancy    prenatal vit no.130-iron-folic (PRENATAL VITAMIN) 27 mg iron- 800 mcg Tab     Health Maintenance   Topic Date Due    Hepatitis C Screening  Never done    Lipid Panel  Never done    HIV Screening  Never done    COVID-19 Vaccine (3 - 2024-25 season) 09/01/2024    Pap Smear  01/10/2026    TETANUS VACCINE  02/13/2031    RSV Vaccine (Age 60+ and Pregnant patients) (1 - 1-dose 75+ series) 12/29/2071    Influenza Vaccine  Completed    Pneumococcal Vaccines (Age 0-49)  Aged Out     Future Appointments   Date Time Provider Department Center   1/13/2026 10:10 AM Lizz Melendez, ANP [facility]       RTC: Patient will be contacted with details of initial OB visit.      Fabio Mcbride MD, MBS  [facility] Family Medicine Resident, [facility]

## 2025-05-08 DIAGNOSIS — N92.6 MISSED PERIOD: ICD-10-CM

## 2025-05-08 DIAGNOSIS — Z32.01 PREGNANCY TEST POSITIVE: ICD-10-CM

## 2025-05-08 RX ORDER — B-COMPLEX WITH VITAMIN C
1 TABLET ORAL DAILY
Qty: 90 TABLET | Refills: 3 | Status: SHIPPED | OUTPATIENT
Start: 2025-05-08

## 2025-05-29 ENCOUNTER — HOSPITAL ENCOUNTER (OUTPATIENT)
Dept: RADIOLOGY | Facility: HOSPITAL | Age: 30
Discharge: HOME | End: 2025-05-29
Attending: OBSTETRICS & GYNECOLOGY
Payer: MEDICAID

## 2025-05-29 ENCOUNTER — OFFICE VISIT (OUTPATIENT)
Dept: FAMILY MEDICINE | Facility: CLINIC | Age: 30
End: 2025-05-29
Payer: MEDICAID

## 2025-05-29 VITALS
HEART RATE: 81 BPM | SYSTOLIC BLOOD PRESSURE: 95 MMHG | BODY MASS INDEX: 26.88 KG/M2 | RESPIRATION RATE: 20 BRPM | WEIGHT: 177.38 LBS | TEMPERATURE: 99 F | HEIGHT: 68 IN | DIASTOLIC BLOOD PRESSURE: 56 MMHG | OXYGEN SATURATION: 97 %

## 2025-05-29 DIAGNOSIS — Z34.90 PREGNANCY: ICD-10-CM

## 2025-05-29 DIAGNOSIS — O26.899 ABDOMINAL CRAMPING AFFECTING PREGNANCY: ICD-10-CM

## 2025-05-29 DIAGNOSIS — Z87.59 HISTORY OF GESTATIONAL HYPERTENSION: ICD-10-CM

## 2025-05-29 DIAGNOSIS — Z3A.14 14 WEEKS GESTATION OF PREGNANCY: Primary | ICD-10-CM

## 2025-05-29 DIAGNOSIS — R10.9 ABDOMINAL CRAMPING AFFECTING PREGNANCY: ICD-10-CM

## 2025-05-29 DIAGNOSIS — O09.299 HISTORY OF PRE-ECLAMPSIA IN PRIOR PREGNANCY, CURRENTLY PREGNANT: ICD-10-CM

## 2025-05-29 LAB
ALBUMIN SERPL-MCNC: 3.6 G/DL (ref 3.5–5)
ALBUMIN/GLOB SERPL: 0.9 RATIO (ref 1.1–2)
ALP SERPL-CCNC: 50 UNIT/L (ref 40–150)
ALT SERPL-CCNC: 45 UNIT/L (ref 0–55)
ANION GAP SERPL CALC-SCNC: 6 MEQ/L
AST SERPL-CCNC: 33 UNIT/L (ref 11–45)
BACTERIA #/AREA URNS AUTO: ABNORMAL /HPF
BASOPHILS # BLD AUTO: 0.02 X10(3)/MCL
BASOPHILS NFR BLD AUTO: 0.2 %
BILIRUB SERPL-MCNC: 0.3 MG/DL
BILIRUB SERPL-MCNC: NEGATIVE MG/DL
BILIRUB UR QL STRIP.AUTO: NEGATIVE
BLOOD URINE, POC: NORMAL
BUN SERPL-MCNC: 6 MG/DL (ref 7–18.7)
C TRACH DNA SPEC QL NAA+PROBE: NOT DETECTED
CALCIUM SERPL-MCNC: 9.7 MG/DL (ref 8.4–10.2)
CHLORIDE SERPL-SCNC: 105 MMOL/L (ref 98–107)
CLARITY UR: CLEAR
CLARITY, POC UA: CLEAR
CO2 SERPL-SCNC: 24 MMOL/L (ref 22–29)
COLOR UR AUTO: ABNORMAL
COLOR, POC UA: YELLOW
CREAT SERPL-MCNC: 0.61 MG/DL (ref 0.55–1.02)
CREAT/UREA NIT SERPL: 10
EOSINOPHIL # BLD AUTO: 0.07 X10(3)/MCL (ref 0–0.9)
EOSINOPHIL NFR BLD AUTO: 0.8 %
ERYTHROCYTE [DISTWIDTH] IN BLOOD BY AUTOMATED COUNT: 11.9 % (ref 11.5–17)
GFR SERPLBLD CREATININE-BSD FMLA CKD-EPI: >60 ML/MIN/1.73/M2
GLOBULIN SER-MCNC: 3.8 GM/DL (ref 2.4–3.5)
GLUCOSE SERPL-MCNC: 66 MG/DL (ref 74–100)
GLUCOSE UR QL STRIP: NEGATIVE
GLUCOSE UR QL STRIP: NORMAL
GROUP & RH: NORMAL
HBV SURFACE AG SERPL QL IA: NONREACTIVE
HCT VFR BLD AUTO: 32.8 % (ref 37–47)
HCV AB SERPL QL IA: NONREACTIVE
HGB BLD-MCNC: 10.7 G/DL (ref 12–16)
HGB UR QL STRIP: ABNORMAL
HIV 1+2 AB+HIV1 P24 AG SERPL QL IA: NONREACTIVE
HYALINE CASTS #/AREA URNS LPF: ABNORMAL /LPF
IMM GRANULOCYTES # BLD AUTO: 0.08 X10(3)/MCL (ref 0–0.04)
IMM GRANULOCYTES NFR BLD AUTO: 0.9 %
INDIRECT COOMBS: NORMAL
KETONES UR QL STRIP: NEGATIVE
KETONES UR QL STRIP: NEGATIVE
LEUKOCYTE ESTERASE UR QL STRIP: 500
LEUKOCYTE ESTERASE URINE, POC: NORMAL
LYMPHOCYTES # BLD AUTO: 2.01 X10(3)/MCL (ref 0.6–4.6)
LYMPHOCYTES NFR BLD AUTO: 22.7 %
MCH RBC QN AUTO: 28.1 PG (ref 27–31)
MCHC RBC AUTO-ENTMCNC: 32.6 G/DL (ref 33–36)
MCV RBC AUTO: 86.1 FL (ref 80–94)
MONOCYTES # BLD AUTO: 0.67 X10(3)/MCL (ref 0.1–1.3)
MONOCYTES NFR BLD AUTO: 7.6 %
MUCOUS THREADS URNS QL MICRO: ABNORMAL /LPF
N GONORRHOEA DNA SPEC QL NAA+PROBE: NOT DETECTED
NEUTROPHILS # BLD AUTO: 6.02 X10(3)/MCL (ref 2.1–9.2)
NEUTROPHILS NFR BLD AUTO: 67.8 %
NITRITE UR QL STRIP: NEGATIVE
NITRITE, POC UA: NEGATIVE
NRBC BLD AUTO-RTO: 0 %
PH UR STRIP: 7.5 [PH]
PH, POC UA: 7.5
PLATELET # BLD AUTO: 251 X10(3)/MCL (ref 130–400)
PMV BLD AUTO: 11.5 FL (ref 7.4–10.4)
POTASSIUM SERPL-SCNC: 3.9 MMOL/L (ref 3.5–5.1)
PROT SERPL-MCNC: 7.4 GM/DL (ref 6.4–8.3)
PROT UR QL STRIP: NEGATIVE
PROTEIN, POC: NEGATIVE
RBC # BLD AUTO: 3.81 X10(6)/MCL (ref 4.2–5.4)
RBC #/AREA URNS AUTO: ABNORMAL /HPF
SODIUM SERPL-SCNC: 135 MMOL/L (ref 136–145)
SP GR UR STRIP.AUTO: 1.02 (ref 1–1.03)
SPECIFIC GRAVITY, POC UA: 1.02
SPECIMEN OUTDATE: NORMAL
SPECIMEN SOURCE: NORMAL
SQUAMOUS #/AREA URNS LPF: ABNORMAL /HPF
T PALLIDUM AB SER QL: NONREACTIVE
UROBILINOGEN UR STRIP-ACNC: NORMAL
UROBILINOGEN, POC UA: 1
WBC # BLD AUTO: 8.87 X10(3)/MCL (ref 4.5–11.5)
WBC #/AREA URNS AUTO: ABNORMAL /HPF

## 2025-05-29 PROCEDURE — 76801 OB US < 14 WKS SINGLE FETUS: CPT | Mod: TC

## 2025-05-29 PROCEDURE — 86762 RUBELLA ANTIBODY: CPT

## 2025-05-29 PROCEDURE — 88174 CYTOPATH C/V AUTO IN FLUID: CPT

## 2025-05-29 PROCEDURE — 81001 URINALYSIS AUTO W/SCOPE: CPT

## 2025-05-29 PROCEDURE — 87389 HIV-1 AG W/HIV-1&-2 AB AG IA: CPT

## 2025-05-29 PROCEDURE — 87086 URINE CULTURE/COLONY COUNT: CPT

## 2025-05-29 PROCEDURE — 87340 HEPATITIS B SURFACE AG IA: CPT

## 2025-05-29 PROCEDURE — 99214 OFFICE O/P EST MOD 30 MIN: CPT | Mod: PBBFAC

## 2025-05-29 PROCEDURE — 86803 HEPATITIS C AB TEST: CPT

## 2025-05-29 PROCEDURE — 86787 VARICELLA-ZOSTER ANTIBODY: CPT

## 2025-05-29 PROCEDURE — 85660 RBC SICKLE CELL TEST: CPT

## 2025-05-29 PROCEDURE — 85025 COMPLETE CBC W/AUTO DIFF WBC: CPT

## 2025-05-29 PROCEDURE — 80053 COMPREHEN METABOLIC PANEL: CPT

## 2025-05-29 PROCEDURE — 86850 RBC ANTIBODY SCREEN: CPT

## 2025-05-29 PROCEDURE — 87491 CHLMYD TRACH DNA AMP PROBE: CPT

## 2025-05-29 PROCEDURE — 86780 TREPONEMA PALLIDUM: CPT

## 2025-05-29 RX ORDER — ASPIRIN 81 MG/1
81 TABLET ORAL DAILY
Qty: 90 TABLET | Refills: 1 | Status: SHIPPED | OUTPATIENT
Start: 2025-05-29 | End: 2025-11-25

## 2025-05-29 NOTE — PATIENT INSTRUCTIONS
Well Child Exam    About this topic  A well child exam is a visit with your child's doctor to check your child's health. The doctor will check your child's growth, progress, and shot record. It is also a time for you to ask your child's doctor any questions you have about your child's health. Your child will have a full exam during the office visit. Other things that are sometimes checked are hearing, eyesight, and urine or blood tests. The doctor may give shots during your child's well visit.    General    Getting Ready for a Well Child Exam    A well child exam is a good time for you to talk with your child's doctor about any of these topics:    Eating habits or diet    How your child acts    Sleep issues    Growth    Safety    Vaccines    Toilet training    Teen years    How your child is doing in school or any learning concerns    Home life    You may want to make a written list of the things you want to talk about with your child's doctor. Be sure to bring your list of questions to your child's well visit. You may also want to do some research on your own before your office visit by reading books or looking at Web sites. Other family members, child caregivers, and grandparents may be able to help you too. Your child's doctor may ask also you about your family's health history or if your child is around anyone who smokes.    The Exam    The doctor measures your child's weight, height, and sometimes head size or body mass index (BMI). The doctor plots these numbers on a growth curve. The growth curve gives a picture of your baby's growth at each visit. The doctor may check your child's temperature, blood pressure, breathing, and heart rate. The doctor may listen to your child's heart, lungs, and belly. Your doctor will do a full exam of your child from the head to the toes.    Growth and Development Questions    Your doctor will ask you about your child's progress. The doctor will focus on the skills that are  likely to happen at your child's age. Some of these are motor skills like rolling over, walking, and running, while others are social skills, or how your child interacts with other people. Your child's doctor will also ask you how your child is doing in school.    Help for Parents    Your doctor will talk with you about any concerns you have about your child during this visit. The doctor may also talk with you about:    Getting family help or other support    Ways to help your child's brain growth    How your child plays and acts with others    Ways to help your child exercise    Safety    Eating habits    Vaccines    Quitting smoking    Help if you have a low mood after having a baby    Shots or Vaccines    It is important for your child to get shots on time. This protects from very serious illnesses like pertussis, measles, or some kinds of pneumonia. Sometimes, your child may need more than one dose of vaccine. The vaccines used today are safer than ever. Talk to your doctor if you have any questions or concerns about giving your child vaccines.    Well Child Exam Schedule    The American Academy of Pediatrics (AAP) suggests this plan for well child visits:    [place] (3 to 5 days old)    1 month old    2 months old    4 months old    6 months old    9 months old    12 months old    15 months old    18 months old    2 years old    30 months old    3 years old    4 years old    Once each year until age 21    Well child exams are very important. Since your child is healthy at this visit and it is scheduled ahead of time, you can think about things you want to ask your child's doctor. Be sure to follow the above plan for well child visits as well as any other visits your child's doctor suggests.    Where can I learn more?    Centers for Disease Control and Prevention    http://www.cdc.gov/vaccines     Healthy  Children    https://www.healthychildren.org/English/family-life/health-management/Pages/Well-Child-Care-A-Check-Up-for-Success.aspx    Disclaimer.  This generalized information is a limited summary of diagnosis, treatment, and/or medication information. It is not meant to be comprehensive and should be used as a tool to help the user understand and/or assess potential diagnostic and treatment options. It does NOT include all information about conditions, treatments, medications, side effects, or risks that may apply to a specific patient. It is not intended to be medical advice or a substitute for the medical advice, diagnosis, or treatment of a health care provider based on the health care provider's examination and assessment of a patients specific and unique circumstances. Patients must speak with a health care provider for complete information about their health, medical questions, and treatment options, including any risks or benefits regarding use of medications. This information does not endorse any treatments or medications as safe, effective, or approved for treating a specific patient. UpToDate, Inc. and its affiliates disclaim any warranty or liability relating to this information or the use thereof. The use of this information is governed by the Terms of Use, available at Terms of Use. ©2022 [employer]ToDate, Inc. and its affiliates and/or licensors. All rights reserved.

## 2025-05-29 NOTE — PROGRESS NOTES
[facility] OB OFFICE VISIT NOTE     Primary PCP: None    Chief Complaint     Salome Pérez is a 29 y.o. female   at 15w1d, TONNY 2025, by Last Menstrual Period presenting to [facility] for initial prenatal visit.    HPI: Reports some intermittent lower abdominal cramping mainly when she is at work which requires her to be on her feet a lot. Drinking 3-4 bottles of water. Reports some fluttering. Denies any contractions, vaginal bleeding/discharge, or LOF.      Relevant PMH:    - History of GHTN and toxemia/pre-E not requiring pharmacological management   - History of  delivery    Obstetrics History     OB History    Para Term  AB Living   2 1  1  1   SAB IAB Ectopic Multiple Live Births       1      # Outcome Date GA Lbr Matthew/2nd Weight Sex Type Anes PTL Lv   2 Current            1      M Vag-Spont  Y XIN      Complications: Toxemia in pregnancy, Hypertensive disease arising during pregnancy     Gynecology History   - LMP: 2025, exact date unknown  - Age at menarche: 13 years  - Menstrual hx: regular, heavy, 5-7 pads/day, 7-9 days per period  - History of birth control: Denies  - History of STDs and/or Abnormal PAPs: Denies  - History of prior : No    Medical History    Past Medical History: Denies any pertinent medical histoyr. GHTN and pre-E in previous pregnancy, History of  delivery   Surgical History: Denies  Family History: Father -- DM, HTN; MGM -- Breast cancer  Social History: History of vaping, but since finding out she was pregnancy at quit. Lives at home with babies father and son. Good support system   Medications: PNV    Review of Systems   Review of Systems   Constitutional:  Negative for activity change, appetite change and fever.   HENT:  Negative for congestion, rhinorrhea and sore throat.    Eyes:  Negative for visual disturbance.   Respiratory:  Negative for cough and shortness of breath.    Cardiovascular:  Negative for chest  "pain.   Gastrointestinal:  Positive for abdominal pain. Negative for diarrhea, nausea and vomiting.   Genitourinary:  Negative for dysuria and hematuria.   Musculoskeletal:  Negative for arthralgias and myalgias.   Skin:  Negative for rash.   Neurological:  Negative for weakness and headaches.   Psychiatric/Behavioral:  Negative for dysphoric mood.      Antepartum specific   - Fetal movements: Yes, some fluttering  - Vaginal bleeding: No  - Vaginal discharge: No  - Loss of fluid: No  - Contractions: No  - Headaches: No  - Vision changes: No  - Edema: No    Vital Signs     Vitals:    05/29/25 0858   BP: (!) 95/56   BP Location: Left arm   Patient Position: Sitting   Pulse: 81   Resp: 20   Temp: 98.7 °F (37.1 °C)   TempSrc: Oral   SpO2: 97%   Weight: 80.5 kg (177 lb 6.4 oz)   Height: 5' 8" (1.727 m)     Physical Exam   General: in no acute distress  RESP: clear to auscultation bilaterally, non labored  CV: regular rate and rhythm, no murmurs, no edema  ABD: non-tender, BS+  FHTs: 154 bpm via U/S  Fundal height: Below the umbilicus     Current Medications:      Current Outpatient Medications   Medication Instructions    aspirin (ECOTRIN) 81 mg, Oral, Daily    prenatal vit no.130-iron-folic (PRENATAL VITAMIN) 27 mg iron- 800 mcg Tab 1 tablet, Oral, Daily     Labs   Urine dipstick:   Lab Results   Component Value Date    COLORU Yellow 05/29/2025    SPECGRAV 1.020 05/29/2025    PHUR 7.5 05/29/2025    WBCUR Small 05/29/2025    NITRITE Negative 05/29/2025    PROTEINPOC Negative 05/29/2025    GLUCOSEUR negative 05/29/2025    KETONESU negative 05/29/2025    UROBILINOGEN 1.0 05/29/2025    BILIRUBINPOC negative 05/29/2025    RBCUR Small 05/29/2025     Initial OB Labs: (Ordered 5/29/25)  - Blood Type and Rh:   - Antibody Screen:   - CBC H/H:   - BUN/Cr:   - HIV:   - RPR:   - GC:   - CT:   - HBsAg:    - HCVAb:   - Rubella:   - Varicella:   - UA & Culture:   - Sickle Cell Screen:    - PAP:   - Influenza vaccine date: N/A   - " Previous  (N/A if not applicable): N/A  - BTL desired (N/A if not applicable): N/A  - Aspirin: Initiated 25: hx of htn in previous pregnancy and hx of preeclampsia in previous pregnancy  Black race    15-20 Weeks Lab:   - Quad Screen:     28 Week Labs:   - 1H GTT:   - Rhogam (N/A if not applicable):   - Date of Tdap:   - CBC H/H:   - RPR:   -  Consent Form Signed (N/A if not applicable):   - BTL consent:   - [facility] for pediatric care:     37 Week Labs:   - CBC H/H:   - RPR:   - GBS Culture:    - HIV:   - Cervical GC:   - Cervical Exam:     38 Weeks:  - Cervical Exam:      39 Weeks:  - Cervical Exam:  - NST Exam:    [facility] Patient: No  Previous : No    Scheduled delivery: induction or  (can NOT be scheduled any earlier than 39w0d):    Imaging    Initial US:   - Completed 25    Anatomy Scan:    Assessment     1. 14 weeks gestation of pregnancy    2. History of gestational hypertension    3. History of pre-eclampsia in prior pregnancy, currently pregnant    4. Abdominal cramping affecting pregnancy      Plan     14 weeks gestation of pregnancy   - OB Protocol discussed  - PNVs  - Urine dip reviewed as above  - Routine labs: Ordered and pending   - Mother plans to breast feed  - Postpartum contraception discussion: Declines  - ED precautions discussed: vaginal bleeding or leaking fluid, belly cramping or pain, SOB/chest pain, swelling of the face/lower extremities, vision changes. If don't feel the baby move in over an hour. Severe headache that are not resolved with medication     History of gestational hypertension  History of pre-eclampsia in prior pregnancy, currently pregnant  - Not requiring pharmacological therapy during previous pregnancy or post-partum  - Initiated on ASA 81 mg 25  - Monitor BP at routine prenatal visits     Abdominal cramping affecting pregnancy   - Advised to stay well hydrated and eat appropriately with 3 balanced meals or incorporating  snacks in between meals  - Encouraged patient to take short breaks while at work of at least 5-10 minutes.  Informed patient letter available upon request if job requiring one       Return to clinic in 3-4 weeks in continuity clinic.      Stephanie Bowen DO  [facility] Family [facility] HO-1

## 2025-05-30 LAB — HGB S BLD QL SOLY: NEGATIVE

## 2025-05-31 LAB
BACTERIA UR CULT: NO GROWTH
RUBV IGG SERPL IA-ACNC: 1.3
RUBV IGG SERPL QL IA: POSITIVE
VZV IGG SER IA-ACNC: 0.2
VZV IGG SER QL IA: NEGATIVE

## 2025-06-02 ENCOUNTER — TELEPHONE (OUTPATIENT)
Dept: HEPATOLOGY | Facility: HOSPITAL | Age: 30
End: 2025-06-02
Payer: MEDICAID

## 2025-06-06 DIAGNOSIS — Z3A.15 15 WEEKS GESTATION OF PREGNANCY: Primary | ICD-10-CM

## 2025-06-16 ENCOUNTER — OFFICE VISIT (OUTPATIENT)
Dept: MATERNAL FETAL MEDICINE | Facility: CLINIC | Age: 30
End: 2025-06-16
Payer: MEDICAID

## 2025-06-16 ENCOUNTER — PROCEDURE VISIT (OUTPATIENT)
Dept: MATERNAL FETAL MEDICINE | Facility: CLINIC | Age: 30
End: 2025-06-16
Payer: MEDICAID

## 2025-06-16 VITALS
HEART RATE: 89 BPM | WEIGHT: 181.88 LBS | DIASTOLIC BLOOD PRESSURE: 81 MMHG | SYSTOLIC BLOOD PRESSURE: 123 MMHG | HEIGHT: 68 IN | BODY MASS INDEX: 27.57 KG/M2

## 2025-06-16 DIAGNOSIS — Z87.59 HISTORY OF PRIOR PREGNANCY WITH IUGR NEWBORN: ICD-10-CM

## 2025-06-16 DIAGNOSIS — O09.899 HISTORY OF PRETERM DELIVERY, CURRENTLY PREGNANT: Primary | ICD-10-CM

## 2025-06-16 DIAGNOSIS — Z87.59 HISTORY OF GESTATIONAL HYPERTENSION: ICD-10-CM

## 2025-06-16 DIAGNOSIS — Z3A.15 15 WEEKS GESTATION OF PREGNANCY: ICD-10-CM

## 2025-06-16 PROCEDURE — 76817 TRANSVAGINAL US OBSTETRIC: CPT | Mod: S$GLB,,, | Performed by: OBSTETRICS & GYNECOLOGY

## 2025-06-16 PROCEDURE — 76816 OB US FOLLOW-UP PER FETUS: CPT | Mod: S$GLB,,, | Performed by: OBSTETRICS & GYNECOLOGY

## 2025-06-16 PROCEDURE — 3079F DIAST BP 80-89 MM HG: CPT | Mod: CPTII,S$GLB,, | Performed by: OBSTETRICS & GYNECOLOGY

## 2025-06-16 PROCEDURE — 3074F SYST BP LT 130 MM HG: CPT | Mod: CPTII,S$GLB,, | Performed by: OBSTETRICS & GYNECOLOGY

## 2025-06-16 PROCEDURE — 1160F RVW MEDS BY RX/DR IN RCRD: CPT | Mod: CPTII,S$GLB,, | Performed by: OBSTETRICS & GYNECOLOGY

## 2025-06-16 PROCEDURE — 1159F MED LIST DOCD IN RCRD: CPT | Mod: CPTII,S$GLB,, | Performed by: OBSTETRICS & GYNECOLOGY

## 2025-06-16 PROCEDURE — 99204 OFFICE O/P NEW MOD 45 MIN: CPT | Mod: TH,S$GLB,, | Performed by: OBSTETRICS & GYNECOLOGY

## 2025-06-16 PROCEDURE — 3008F BODY MASS INDEX DOCD: CPT | Mod: CPTII,S$GLB,, | Performed by: OBSTETRICS & GYNECOLOGY

## 2025-06-16 NOTE — ASSESSMENT & PLAN NOTE
I reviewed the patient's obstetric history which is remarkable for development of gHTN at the end of her previous pregnancy. We reviewed the risk of recurrence in subsequent pregnancies. Subsequent pregnancies in women with severe preeclampsia are at risk of other  complications including abruption,  birth, FGR, and increased  mortality. The patient's blood pressure normalized following delivery. We reviewed the role of low dose aspirin therapy in regards to preeclampsia risk reduction in subsequent pregnancies.    Recommendations:  Start aspirin 81 mg daily at 12-16 weeks for preeclampsia risk reduction  Obtain baseline preeclampsia studies: (CMP, CBC, 24 hour urine protein or urine protein/creatinine ratio)  Close surveillance for signs/symptoms of preeclampsia in second/third trimester and postpartum period

## 2025-06-16 NOTE — PROGRESS NOTES
"MATERNAL-FETAL MEDICINE   CONSULT NOTE    Provider requesting consultation: [facility]    SUBJECTIVE:     Ms. Salome Pérez is a 29 y.o.  female with IUP at 16w5d who is seen in consultation by MFM for evaluation and management of:  Problem   - History of  delivery, currently pregnant   - History of gestational hypertension   - History of prior pregnancy with IUGR      Salome presents for consultation due to her OB history. In her previous pregnancy, she developed gHTN "at the end" of her pregnancy. She also had FGR and delivered  at 35wks d/t PTL. She has a prescription of low dose aspirin but has not started taking this medication as of yet.   No biochemical screening as of yet.    She had significant stomach cramping last week but after discussion she does think it could be GI related.  She is not needing anything for constipation at this time.       Medication List with Changes/Refills   Current Medications    ASPIRIN (ECOTRIN) 81 MG EC TABLET    Take 1 tablet (81 mg total) by mouth once daily.    PNV,CALCIUM 72-IRON-FOLIC ACID (PRENATAL VITAMIN PLUS LOW IRON) 27 MG IRON- 1 MG TAB    Take 1 tablet (1 each total) by mouth once daily.    PRENATAL VIT NO.124/IRON/FOLIC (PRENATAL VITAMIN ORAL)    Take by mouth.       Review of patient's allergies indicates:  No Known Allergies    PMH:  Past Medical History:   Diagnosis Date    Hypertension     GHTN       PObHx:  OB History    Para Term  AB Living   2 1  1  1   SAB IAB Ectopic Multiple Live Births       1      # Outcome Date GA Lbr Matthew/2nd Weight Sex Type Anes PTL Lv   2 Current            1  21 35w0d  2.013 kg (4 lb 7 oz) M Vag-Spont  Y XIN      Complications: Toxemia in pregnancy, Hypertensive disease arising during pregnancy       PSH:History reviewed. No pertinent surgical history.    Family history:family history includes Cancer in her paternal grandmother; Diabetes in her father; Hypertension in her " "father.    Social history: reports that she has quit smoking. Her smoking use included cigarettes. She has never been exposed to tobacco smoke. She has never used smokeless tobacco. She reports that she does not currently use alcohol. She reports that she does not use drugs.    Genetic history:  The patient denies any inherited genetic diseases or birth defects in herself or her partner's personal history or family.      Objective:   /81 (BP Location: Right arm, Patient Position: Sitting)   Pulse 89   Ht 5' 8" (1.727 m)   Wt 82.5 kg (181 lb 14.1 oz)   LMP 2025 (Approximate)   BMI 27.65 kg/m²     Ultrasound performed. See viewpoint for full ultrasound report.  A noriega living IUP is identified, and the biometry is appropriate for established gestational age. Early, limited anatomy appears normal. The placenta is anterior. Transvaginal cervical length measures 3.6cm.       ASSESSMENT/PLAN:     29 y.o.  female with IUP at 16w5d     Assessment & Plan  15 weeks gestation of pregnancy    - History of  delivery, currently pregnant  She has a history of sPTB at 35 weeks gestation.   In patients with a history of spontaneous  delivery prior to 37 weeks gestation, [facility] currently recommends consideration of the use of progesterone therapy for the prevention of recurrent  birth.  Hazard and its generic formulations (17-OHP) have been recently pulled from the market, therefore, we no longer recommend its usage for the prevention of  birth.  There is increasing evidence supporting the use of alternative forms of progesterone supplementation (ie vaginal progesterone 200 mg qhs) for women have been thoroughly counseled regarding the benefits, risks, costs, and logistics of each form of progesterone supplementation.  We also discussed recommendations for cervical length monitoring biweekly from 16 weeks until 22 weeks 6 days gestation as this may also help identify patients in " whom cerclage can be considered to reduce the risk of  birth as well.     Recommendations   Consider alternative supplementation with vaginal progesterone 200 mg qhs IF cervical shortening encountered   Start cervical length surveillance every 2 weeks, starting at 16 weeks gestation and continuing until 22 weeks 6 days gestation  If the cervix measures <30 mm, perform weekly cervical length   If the cervical length is <25 mm, cerclage should be offered in women with a prior  birth    - History of gestational hypertension  I reviewed the patient's obstetric history which is remarkable for development of gHTN at the end of her previous pregnancy. We reviewed the risk of recurrence in subsequent pregnancies. Subsequent pregnancies in women with severe preeclampsia are at risk of other  complications including abruption,  birth, FGR, and increased  mortality. The patient's blood pressure normalized following delivery. We reviewed the role of low dose aspirin therapy in regards to preeclampsia risk reduction in subsequent pregnancies.    Recommendations:  Start aspirin 81 mg daily at 12-16 weeks for preeclampsia risk reduction  Obtain baseline preeclampsia studies: (CMP, CBC, 24 hour urine protein or urine protein/creatinine ratio)  Close surveillance for signs/symptoms of preeclampsia in second/third trimester and postpartum period      - History of prior pregnancy with IUGR   I reviewed the patient's obstetric history which is remarkable for a previous pregnancy complicated by FGR. Other conditions noted during that pregnancy include: gHTN I counseled the patient regarding the recurrence risk for FGR (however the exact number is not clear, likely ~15-20% recurrence.). We discussed recommendations for management during this pregnancy including evaluation of fetal growth in the third trimester. Low molecular weight heparin and aspirin have not been beneficial in preventing  recurrent FGR. If the patient had preeclampsia or gestational hypertension associated with FGR, low dose aspirin therapy should be initiated at 12-16 weeks in subsequent pregnancies. Patient was counseled on modifiable risk factors including tobacco cessation, abstinence from alcohol and drug use, and maternal diet (ensuring appropriate gestational weight gain).     Recommendations:  -Fetal growth ultrasounds with MFM  -Antepartum surveillance is not indicated in absence of FGR diagnosis or other maternal-fetal indications for prenatal testing  -Monitor gestational weight gain, encourage prenatal vitamin        FOLLOW UP:   F/u in 2 weeks for cervical length only  F/u in 4 weeks for US/MFM visit      Jaqueline Zuniga MD  Maternal Fetal Medicine

## 2025-06-16 NOTE — ASSESSMENT & PLAN NOTE
I reviewed the patient's obstetric history which is remarkable for a previous pregnancy complicated by FGR. Other conditions noted during that pregnancy include: gHTN I counseled the patient regarding the recurrence risk for FGR (however the exact number is not clear, likely ~15-20% recurrence.). We discussed recommendations for management during this pregnancy including evaluation of fetal growth in the third trimester. Low molecular weight heparin and aspirin have not been beneficial in preventing recurrent FGR. If the patient had preeclampsia or gestational hypertension associated with FGR, low dose aspirin therapy should be initiated at 12-16 weeks in subsequent pregnancies. Patient was counseled on modifiable risk factors including tobacco cessation, abstinence from alcohol and drug use, and maternal diet (ensuring appropriate gestational weight gain).     Recommendations:  -Fetal growth ultrasounds with MFM  -Antepartum surveillance is not indicated in absence of FGR diagnosis or other maternal-fetal indications for prenatal testing  -Monitor gestational weight gain, encourage prenatal vitamin

## 2025-06-16 NOTE — ASSESSMENT & PLAN NOTE
She has a history of sPTB at 35 weeks gestation.   In patients with a history of spontaneous  delivery prior to 37 weeks gestation, [facility] currently recommends consideration of the use of progesterone therapy for the prevention of recurrent  birth.  Afua and its generic formulations (17-OHP) have been recently pulled from the market, therefore, we no longer recommend its usage for the prevention of  birth.  There is increasing evidence supporting the use of alternative forms of progesterone supplementation (ie vaginal progesterone 200 mg qhs) for women have been thoroughly counseled regarding the benefits, risks, costs, and logistics of each form of progesterone supplementation.  We also discussed recommendations for cervical length monitoring biweekly from 16 weeks until 22 weeks 6 days gestation as this may also help identify patients in whom cerclage can be considered to reduce the risk of  birth as well.     Recommendations   Consider alternative supplementation with vaginal progesterone 200 mg qhs IF cervical shortening encountered   Start cervical length surveillance every 2 weeks, starting at 16 weeks gestation and continuing until 22 weeks 6 days gestation  If the cervix measures <30 mm, perform weekly cervical length   If the cervical length is <25 mm, cerclage should be offered in women with a prior  birth

## 2025-06-23 ENCOUNTER — OFFICE VISIT (OUTPATIENT)
Dept: FAMILY MEDICINE | Facility: CLINIC | Age: 30
End: 2025-06-23
Payer: MEDICAID

## 2025-06-23 VITALS
OXYGEN SATURATION: 98 % | WEIGHT: 183.19 LBS | SYSTOLIC BLOOD PRESSURE: 118 MMHG | HEART RATE: 88 BPM | TEMPERATURE: 98 F | DIASTOLIC BLOOD PRESSURE: 72 MMHG | BODY MASS INDEX: 27.76 KG/M2 | HEIGHT: 68 IN

## 2025-06-23 DIAGNOSIS — O09.899 HISTORY OF PRETERM DELIVERY, CURRENTLY PREGNANT: ICD-10-CM

## 2025-06-23 DIAGNOSIS — O09.299 HISTORY OF PRE-ECLAMPSIA IN PRIOR PREGNANCY, CURRENTLY PREGNANT: ICD-10-CM

## 2025-06-23 DIAGNOSIS — O09.899 SUSCEPTIBLE TO VARICELLA (NON-IMMUNE), CURRENTLY PREGNANT: ICD-10-CM

## 2025-06-23 DIAGNOSIS — Z87.59 HISTORY OF GESTATIONAL HYPERTENSION: ICD-10-CM

## 2025-06-23 DIAGNOSIS — Z28.39 SUSCEPTIBLE TO VARICELLA (NON-IMMUNE), CURRENTLY PREGNANT: ICD-10-CM

## 2025-06-23 DIAGNOSIS — Z87.59 HISTORY OF PRIOR PREGNANCY WITH IUGR NEWBORN: ICD-10-CM

## 2025-06-23 DIAGNOSIS — Z3A.17 17 WEEKS GESTATION OF PREGNANCY: Primary | ICD-10-CM

## 2025-06-23 LAB
BILIRUB SERPL-MCNC: NEGATIVE MG/DL
BLOOD URINE, POC: NORMAL
CLARITY, POC UA: CLEAR
COLOR, POC UA: YELLOW
CREAT UR-MCNC: 106.7 MG/DL (ref 45–106)
GLUCOSE UR QL STRIP: NEGATIVE
KETONES UR QL STRIP: NEGATIVE
LEUKOCYTE ESTERASE URINE, POC: NORMAL
NITRITE, POC UA: NEGATIVE
PH, POC UA: 6
PROT UR STRIP-MCNC: 7.1 MG/DL
PROTEIN, POC: NEGATIVE
SPECIFIC GRAVITY, POC UA: >=1.03
URINE PROTEIN/CREATININE RATIO (OLG): 0.1
UROBILINOGEN, POC UA: 0.2

## 2025-06-23 PROCEDURE — 81002 URINALYSIS NONAUTO W/O SCOPE: CPT | Mod: PBBFAC

## 2025-06-23 PROCEDURE — 99213 OFFICE O/P EST LOW 20 MIN: CPT | Mod: PBBFAC

## 2025-06-23 PROCEDURE — 84156 ASSAY OF PROTEIN URINE: CPT

## 2025-06-23 PROCEDURE — 81511 FTL CGEN ABNOR FOUR ANAL: CPT

## 2025-06-23 NOTE — PROGRESS NOTES
[facility] OB OFFICE VISIT NOTE     Primary PCP: none    Chief Complaint     Salome Pérez is a 29 y.o. female   17w5d 2025, by Ultrasound presenting to [facility] for routine OB visit.    HPI: some abdominal cramping when lying down, but otherwise no acute concerns. Seen by MFM on 25, will need cervical length surveillance every 2 weeks between 16 weeks and 22w6d, may need to start vaginal progesterone if cervical length shortening is encountered.     Relevant PMH:   - History of GHTN and toxemia/pre-E not requiring pharmacological management   - History of  delivery   - History of prior pregnancy with IUGR    Obstetrics History     OB History          2    Para   1    Term           1    AB        Living   1         SAB        IAB        Ectopic        Multiple        Live Births   1                  Gynecology History   - LMP: 2025, exact date unknown  - Age at menarche: 13 years  - Menstrual hx: regular, heavy, 5-7 pads/day, 7-9 days per period  - History of birth control: Denies  - History of STDs and/or Abnormal PAPs: Denies  - History of prior : No    Medical History    Past Medical History: Denies any pertinent medical histoyr. GHTN and pre-E in previous pregnancy, History of  delivery   Surgical History: Denies  Family History: Father -- DM, HTN; MGM -- Breast cancer  Social History: History of vaping, but since finding out she was pregnancy at quit. Lives at home with babies father and son. Good support system   Medications: PNV, ASA 81mg daily    Review of Systems     Antepartum specific   - Fetal movements: yes, flutters  - Vaginal bleeding: no  - Vaginal discharge: no  - Loss of fluid: no  - Contractions: no  - Headaches: no  - Vision changes: no  - Edema: no    Vital Signs     Vitals:    25 0900   BP: 118/72   BP Location: Right arm   Patient Position: Sitting   Pulse: 88   Temp: 98.2 °F (36.8 °C)   TempSrc: Oral   SpO2: 98%  "  Weight: 83.1 kg (183 lb 3.2 oz)   Height: 5' 8" (1.727 m)       Physical Exam   General: in no acute distress  RESP: clear to auscultation bilaterally, non labored  CV: regular rate and rhythm, no murmurs, no edema  ABD: non-tender, BS+  FHTs: 155 bpm via Doppler  Fundal height: 17 cm    Current Medications:      Current Outpatient Medications   Medication Instructions    aspirin (ECOTRIN) 81 mg, Oral, Daily    prenatal vit no.124/iron/folic (PRENATAL VITAMIN ORAL) Take by mouth.       Labs   Urine dipstick:   Lab Results   Component Value Date    COLORU Yellow 2025    SPECGRAV >=1.030 2025    PHUR 6.0 2025    WBCUR trace 2025    NITRITE negative 2025    PROTEINPOC negative 2025    GLUCOSEUR negative 2025    KETONESU negative 2025    UROBILINOGEN 0.2 2025    BILIRUBINPOC negative 2025    RBCUR trace-lysed 2025       Initial OB Labs: (Date 25)  - Blood Type and Rh: O POS  - Antibody Screen: NEG  - CBC H/H: 10.7/32.8  - BUN/Cr: 6/0.61  - HIV: NR  - RPR: NR  - GC: ND  - CT: ND  - HBsAg: NR   - HCVAb: NR  - Rubella: Immune  - Varicella: Non-immune  - UA & Culture: trace blood, 500 LE, 11-20 RBC, trace bacteria; no growth on urine culture   - Sickle Cell Screen: Negative   - PAP: NIL  - Influenza vaccine date: N/A   - Previous  (N/A if not applicable): N/A  - BTL desired (N/A if not applicable): N/A  - Aspirin: initiated 25  hx of htn in previous pregnancy and hx of preeclampsia in previous pregnancy  Black race    15-20 Weeks Lab: (Date ordered 25)  - Quad Screen:     28 Week Labs: (Date)  - 1H GTT:   - Rhogam (N/A if not applicable):   - Date of Tdap:   - CBC H/H:   - RPR:   -  Consent Form Signed (N/A if not applicable):   - BTL consent:   - [facility] for pediatric care:     37 Week Labs: (Date)  - CBC H/H:   - RPR:   - GBS Culture:    - HIV:   - Cervical GC:   - Cervical Exam:     38 Weeks:  -Cervical Exam:     39 " Weeks:  -Cervical Exam:  -NST Exam:    FM Continuity Patient: no  Previous : no    Scheduled delivery: induction or  (can NOT be scheduled any earlier than 39w0d):    Imaging    Initial US: 25  Impression:     Single viable intrauterine pregnancy with a crown-rump length indicative of a 14 week 2 day gestation    Anatomy Scan: 25  Impression   =========   A noriega living IUP is identified, and the biometry is appropriate for established gestational age. Early, limited anatomy appears normal. The placenta is anterior.   Transvaginal cervical length measures 3.6cm.     Recommendation   ==============   Suggest repeat scan in 2 weeks for cervical length.       Assessment     1. 17 weeks gestation of pregnancy    2. History of gestational hypertension    3. History of pre-eclampsia in prior pregnancy, currently pregnant    4. - History of  delivery, currently pregnant    5. - History of prior pregnancy with IUGR     6. Susceptible to varicella (non-immune), currently pregnant        Plan     17 weeks gestation of pregnancy (Primary)  - OB Protocol discussed  - PNVs  - Urine dip reviewed as above  - Routine labs: reviewed, quad screen ordered today  - Mother plans to breast feed  - Postpartum contraception discussion: none  - ED precautions discussed: vaginal bleeding or leaking fluid, belly cramping or pain, SOB/chest pain, swelling of the face/lower extremities, vision changes. If don't feel the baby move in over an hour. Severe headache that are not resolved with medication     History of gestational hypertension  History of pre-eclampsia in prior pregnancy, currently pregnant  - Not requiring pharmacological therapy during previous pregnancy or post-partum  - Initiated on ASA 81 mg 25, patient started taking on 25  - Monitor BP at routine prenatal visits   - Baseline Urine Pro/Cre ratio collected today per [facility]    History of  delivery, currently pregnant  -  Cervical length surveillance every 2 weeks between 16 weeks and 22w6d with MFM; may need to start vaginal progesterone if cervical length shortening is encountered.     History of prior pregnancy with IUGR   - Fetal growth US with MFM      Return to clinic in 4 weeks       Karina Love MD    [facility] Resident, -1

## 2025-06-24 LAB
# FETUSES: 1
2ND TRIMESTER 4 SCREEN SERPL-IMP: NORMAL
AFP ADJ MOM SERPL: 0.66 MOM
AFP SERPL IA-MCNC: 26.8 NG/ML
AGE AT DELIVERY: NORMAL
B-HCG ADJ MOM SERPL: 0.51 MOM
CHORION TYPE: NORMAL
COLLECT DATE: NORMAL
CURRENT SMOKER: NORMAL
FET TS 21 RISK FROM MAT AGE: NORMAL
GA METHOD: NORMAL
GA US.COMPOSITE.EST: NORMAL WK,D
HCG SERPL IA-ACNC: 13.2 IU/ML
HX OF NTD QL: NO
HX OF NTD QL: NO
HX OF TRISOMY 21 QL: NO
IDDM PATIENT QL: NO
INHIBIN A ADJ MOM SERPL: 0.9 MOM
INHIBIN SERPL-MCNC: 122 PG/ML
IVF PREGNANCY: NO
LABORATORY COMMENT REPORT: NORMAL
M PHYSICIAN PHONE NUMBER: NORMAL
MATERNAL RISK FACTORS: NORMAL
NEURAL TUBE DEFECT RISK FETUS: NORMAL %
RECOM F/U: NORMAL
TEST PERFORMANCE INFO SPEC: NORMAL
TS 18 RISK FETUS: NORMAL
TS 21 RISK FETUS: NORMAL
U ESTRIOL ADJ MOM SERPL: 1.28 MOM
U ESTRIOL SERPL-MCNC: 1.86 NG/ML

## 2025-06-30 DIAGNOSIS — O09.899 HISTORY OF PRETERM DELIVERY, CURRENTLY PREGNANT: Primary | ICD-10-CM

## 2025-07-02 ENCOUNTER — PROCEDURE VISIT (OUTPATIENT)
Dept: MATERNAL FETAL MEDICINE | Facility: CLINIC | Age: 30
End: 2025-07-02
Payer: MEDICAID

## 2025-07-02 DIAGNOSIS — O09.899 HISTORY OF PRETERM DELIVERY, CURRENTLY PREGNANT: ICD-10-CM

## 2025-07-14 ENCOUNTER — OFFICE VISIT (OUTPATIENT)
Dept: MATERNAL FETAL MEDICINE | Facility: CLINIC | Age: 30
End: 2025-07-14
Payer: MEDICAID

## 2025-07-14 ENCOUNTER — OFFICE VISIT (OUTPATIENT)
Dept: URGENT CARE | Facility: CLINIC | Age: 30
End: 2025-07-14
Payer: MEDICAID

## 2025-07-14 ENCOUNTER — PROCEDURE VISIT (OUTPATIENT)
Dept: MATERNAL FETAL MEDICINE | Facility: CLINIC | Age: 30
End: 2025-07-14
Payer: MEDICAID

## 2025-07-14 VITALS
HEIGHT: 68 IN | RESPIRATION RATE: 20 BRPM | BODY MASS INDEX: 28.92 KG/M2 | TEMPERATURE: 100 F | SYSTOLIC BLOOD PRESSURE: 115 MMHG | DIASTOLIC BLOOD PRESSURE: 75 MMHG | WEIGHT: 190.81 LBS | OXYGEN SATURATION: 98 % | HEART RATE: 102 BPM

## 2025-07-14 VITALS
BODY MASS INDEX: 27.19 KG/M2 | DIASTOLIC BLOOD PRESSURE: 72 MMHG | HEIGHT: 68 IN | HEART RATE: 94 BPM | SYSTOLIC BLOOD PRESSURE: 113 MMHG | WEIGHT: 179.44 LBS

## 2025-07-14 DIAGNOSIS — O09.899 HISTORY OF PRETERM DELIVERY, CURRENTLY PREGNANT: Primary | ICD-10-CM

## 2025-07-14 DIAGNOSIS — Z87.59 HISTORY OF PRIOR PREGNANCY WITH IUGR NEWBORN: ICD-10-CM

## 2025-07-14 DIAGNOSIS — R05.9 COUGH, UNSPECIFIED TYPE: Primary | ICD-10-CM

## 2025-07-14 DIAGNOSIS — O09.899 HISTORY OF PRETERM DELIVERY, CURRENTLY PREGNANT: ICD-10-CM

## 2025-07-14 DIAGNOSIS — Z87.59 HISTORY OF GESTATIONAL HYPERTENSION: ICD-10-CM

## 2025-07-14 LAB
CTP QC/QA: YES
CTP QC/QA: YES
MOLECULAR STREP A: NEGATIVE
SARS-COV-2 RDRP RESP QL NAA+PROBE: NEGATIVE

## 2025-07-14 PROCEDURE — 99213 OFFICE O/P EST LOW 20 MIN: CPT | Mod: TH,25,S$GLB, | Performed by: OBSTETRICS & GYNECOLOGY

## 2025-07-14 PROCEDURE — 3078F DIAST BP <80 MM HG: CPT | Mod: CPTII,S$GLB,, | Performed by: OBSTETRICS & GYNECOLOGY

## 2025-07-14 PROCEDURE — 3074F SYST BP LT 130 MM HG: CPT | Mod: CPTII,S$GLB,, | Performed by: OBSTETRICS & GYNECOLOGY

## 2025-07-14 PROCEDURE — 76805 OB US >/= 14 WKS SNGL FETUS: CPT | Mod: S$GLB,,, | Performed by: OBSTETRICS & GYNECOLOGY

## 2025-07-14 PROCEDURE — 3008F BODY MASS INDEX DOCD: CPT | Mod: CPTII,S$GLB,, | Performed by: OBSTETRICS & GYNECOLOGY

## 2025-07-14 PROCEDURE — 1160F RVW MEDS BY RX/DR IN RCRD: CPT | Mod: CPTII,S$GLB,, | Performed by: OBSTETRICS & GYNECOLOGY

## 2025-07-14 PROCEDURE — 87635 SARS-COV-2 COVID-19 AMP PRB: CPT | Mod: PBBFAC | Performed by: NURSE PRACTITIONER

## 2025-07-14 PROCEDURE — 87651 STREP A DNA AMP PROBE: CPT | Mod: PBBFAC | Performed by: NURSE PRACTITIONER

## 2025-07-14 PROCEDURE — 76817 TRANSVAGINAL US OBSTETRIC: CPT | Mod: S$GLB,,, | Performed by: OBSTETRICS & GYNECOLOGY

## 2025-07-14 PROCEDURE — 99214 OFFICE O/P EST MOD 30 MIN: CPT | Mod: PBBFAC | Performed by: NURSE PRACTITIONER

## 2025-07-14 PROCEDURE — 1159F MED LIST DOCD IN RCRD: CPT | Mod: CPTII,S$GLB,, | Performed by: OBSTETRICS & GYNECOLOGY

## 2025-07-14 NOTE — ASSESSMENT & PLAN NOTE
She has a history of sPTB at 35 weeks gestation.   In patients with a history of spontaneous  delivery prior to 37 weeks gestation, [facility] currently recommends consideration of the use of progesterone therapy for the prevention of recurrent  birth.  Afua and its generic formulations (17-OHP) have been recently pulled from the market, therefore, we no longer recommend its usage for the prevention of  birth.  There is increasing evidence supporting the use of alternative forms of progesterone supplementation (ie vaginal progesterone 200 mg qhs) for women have been thoroughly counseled regarding the benefits, risks, costs, and logistics of each form of progesterone supplementation.  We also discussed recommendations for cervical length monitoring biweekly from 16 weeks until 22 weeks 6 days gestation as this may also help identify patients in whom cerclage can be considered to reduce the risk of  birth as well.     Recommendations   Consider alternative supplementation with vaginal progesterone 200 mg qhs IF cervical shortening encountered   Start cervical length surveillance every 2 weeks, starting at 16 weeks gestation and continuing until 22 weeks 6 days gestation  If the cervix measures <30 mm, perform weekly cervical length   If the cervical length is <25 mm, cerclage should be offered in women with a prior  birth

## 2025-07-14 NOTE — PROGRESS NOTES
"Subjective:      Patient ID: Salome Pérez is a 29 y.o. female.    Vitals:  height is 5' 8" (1.727 m) and weight is 86.5 kg (190 lb 12.8 oz). Her oral temperature is 99.5 °F (37.5 °C). Her blood pressure is 115/75 and her pulse is 102. Her respiration is 20 and oxygen saturation is 98%.     Chief Complaint: URI (Pt co cough x 2 days. Pt is 5 months pregnant )    URI      As stated in CC. Pt is requesting Covid and strep testing as she states " my high risk dr wants me to be tested. Pt was seen in Maternal Fetal med today she is high risk pregnancy.Pt c/o scratchy throat, discomfort in chest from cough. Pt Denies shortness for breath or chest pain, dizziness, weakness, stomach pain, nausea or vomiting vaginal discharge, dysuria odor or itching.    Skin:  Negative for erythema.      Objective:     Physical Exam   Constitutional: She is oriented to person, place, and time. She appears well-developed. She is cooperative.  Non-toxic appearance. She does not appear ill. No distress. awake  HENT:   Head: Normocephalic and atraumatic.   Ears:   Right Ear: Tympanic membrane normal.   Left Ear: Tympanic membrane normal.   Nose: Rhinorrhea and congestion present. No purulent discharge. Right sinus exhibits no maxillary sinus tenderness and no frontal sinus tenderness. Left sinus exhibits no maxillary sinus tenderness and no frontal sinus tenderness.   Mouth/Throat: Uvula is midline. No oropharyngeal exudate or posterior oropharyngeal erythema.   Eyes: Conjunctivae are normal. Right eye exhibits no discharge. Left eye exhibits no discharge. Extraocular movement intact   Neck: Neck supple. Normal carotid pulses and no JVD present. No neck rigidity present.   Cardiovascular: Normal rate, regular rhythm and normal pulses.   Pulmonary/Chest: Effort normal and breath sounds normal. No stridor. No respiratory distress. She has no wheezes. She has no rhonchi. She has no rales. She exhibits no tenderness.   Abdominal: Normal " appearance and bowel sounds are normal. Soft. There is no left CVA tenderness and no right CVA tenderness.   Musculoskeletal: Normal range of motion.         General: Normal range of motion.      Cervical back: She exhibits no tenderness.      Right lower leg: No edema.      Left lower leg: No edema.   Lymphadenopathy:        Head (right side): No submental, no submandibular, no tonsillar, no preauricular, no posterior auricular and no occipital adenopathy present.        Head (left side): No submental, no submandibular, no tonsillar, no preauricular, no posterior auricular and no occipital adenopathy present.     She has no cervical adenopathy.   Neurological: no focal deficit. She is alert and oriented to person, place, and time.   Skin: Skin is warm, dry, not diaphoretic and no rash. Capillary refill takes less than 2 seconds. No erythema   Psychiatric: Her behavior is normal. Mood, judgment and thought content normal.   Nursing note and vitals reviewed.      Assessment:     1. Cough, unspecified type      Results for orders placed or performed in visit on 07/14/25   POCT Strep A, Molecular    Collection Time: 07/14/25  4:40 PM   Result Value Ref Range    Molecular Strep A, POC Negative Negative     Acceptable Yes    POCT COVID-19 Rapid Screening    Collection Time: 07/14/25  4:41 PM   Result Value Ref Range    POC Rapid COVID Negative Negative     Acceptable Yes        Plan:     ER precautions given and discussed  Patient encouraged symptomatic treatment and to reach out to OBGYN to discussed safe symptomatic management during her pregnancy due to high-risk patient given some suggestions for over-the-counter remedy safe for pregnancy as well.  She verbalized understanding and agree with plan of care  Cough, unspecified type  -     POCT COVID-19 Rapid Screening  -     POCT Strep A, Molecular

## 2025-07-14 NOTE — PATIENT INSTRUCTIONS
Please follow instructions on patient education material.      Return to urgent care in 2 to 3 days if symptoms are not improving, immediately if you develop any new or worsening symptoms.   - nasal saline lavage  - OTC cold/flu products as desired for symptoms  - Plenty of fluids  - Humidified air  - Tylenol for pain/fever  As discussed please consult with your High risk OB-GYN  To discuss safe symptom management:  OTC URI remedies safe for pregnancy:  Zarbee's products  Claritin/Zyrtec  Flonase 1 spray per side 1x/day  Chloraseptic spray and lozenges  Humidifier  Neti Pot  Demario's chest and back rub  Breathe Right nose strips    Go to the ER if you experience chest pain with shortness of breath, shortness of breath when moving around your house, high fevers 103.0+, excessive vomiting/diarrhea, or general distress.

## 2025-07-14 NOTE — PROGRESS NOTES
"Maternal Fetal Medicine follow up consult    SUBJECTIVE:     Salome Pérez is a 29 y.o.  female with IUP at 20w5d who is seen in follow up consultation by MFM.  Pregnancy complications include:   Problem   - History of  delivery, currently pregnant   - History of gestational hypertension   - History of prior pregnancy with IUGR      Salome presents for routine follow up appointment.  She recently returned from a vacation to [place]. Since getting back home, she has complaints of a sore throat, postnasal drip, and cough. She has not yet sought care at urgent care for these complaints. Encouraged to do so.  Denies LOF, VB, contractions. Positive fetal movement.    Previous notes reviewed.   No changes to medical, surgical, family, social, or obstetric history.  Interval history since last MFM visit: see above  Medications reviewed.    Care team members:  [facility]     OBJECTIVE:   /72 (BP Location: Right arm, Patient Position: Sitting)   Pulse 94   Ht 5' 8" (1.727 m)   Wt 81.4 kg (179 lb 7.3 oz)   LMP 2025 (Approximate)   BMI 27.29 kg/m²     Ultrasound performed. See viewpoint for full ultrasound report.    A noriega living IUP is identified.   Fetal size is appropriate for established dating.   No fetal structural malformations are identified; however, the anatomic survey is incomplete as noted above. The patient will be scheduled for a f/u exam to complete the anatomic survey.   Transvaginal cervical length is reassurring indicating decreased risk for PTD.   Placental location is posterior without evidence of previa.     ASSESSMENT/PLAN:     29 y.o.  female with IUP at 20w5d    - History of  delivery, currently pregnant  She has a history of sPTB at 35 weeks gestation.   In patients with a history of spontaneous  delivery prior to 37 weeks gestation, [facility] currently recommends consideration of the use of progesterone therapy for the " prevention of recurrent  birth.  [place] and its generic formulations (17-OHP) have been recently pulled from the market, therefore, we no longer recommend its usage for the prevention of  birth.  There is increasing evidence supporting the use of alternative forms of progesterone supplementation (ie vaginal progesterone 200 mg qhs) for women have been thoroughly counseled regarding the benefits, risks, costs, and logistics of each form of progesterone supplementation.  We also discussed recommendations for cervical length monitoring biweekly from 16 weeks until 22 weeks 6 days gestation as this may also help identify patients in whom cerclage can be considered to reduce the risk of  birth as well.     Recommendations   Consider alternative supplementation with vaginal progesterone 200 mg qhs IF cervical shortening encountered   Start cervical length surveillance every 2 weeks, starting at 16 weeks gestation and continuing until 22 weeks 6 days gestation  If the cervix measures <30 mm, perform weekly cervical length   If the cervical length is <25 mm, cerclage should be offered in women with a prior  birth      - History of prior pregnancy with IUGR   I reviewed the patient's obstetric history which is remarkable for a previous pregnancy complicated by FGR. Other conditions noted during that pregnancy include: gHTN I counseled the patient regarding the recurrence risk for FGR (however the exact number is not clear, likely ~15-20% recurrence.). We discussed recommendations for management during this pregnancy including evaluation of fetal growth in the third trimester. Low molecular weight heparin and aspirin have not been beneficial in preventing recurrent FGR. If the patient had preeclampsia or gestational hypertension associated with FGR, low dose aspirin therapy should be initiated at 12-16 weeks in subsequent pregnancies. Patient was counseled on modifiable risk factors  including tobacco cessation, abstinence from alcohol and drug use, and maternal diet (ensuring appropriate gestational weight gain).     Recommendations:  -Fetal growth ultrasounds with MFM  -Antepartum surveillance is not indicated in absence of FGR diagnosis or other maternal-fetal indications for prenatal testing  -Monitor gestational weight gain, encourage prenatal vitamin      - History of gestational hypertension  I reviewed the patient's obstetric history which is remarkable for development of gHTN at the end of her previous pregnancy. We reviewed the risk of recurrence in subsequent pregnancies. Subsequent pregnancies in women with severe preeclampsia are at risk of other  complications including abruption,  birth, FGR, and increased  mortality. The patient's blood pressure normalized following delivery. We reviewed the role of low dose aspirin therapy in regards to preeclampsia risk reduction in subsequent pregnancies.    Recommendations:  Start aspirin 81 mg daily at 12-16 weeks for preeclampsia risk reduction  Obtain baseline preeclampsia studies: (CMP, CBC, 24 hour urine protein or urine protein/creatinine ratio)  Close surveillance for signs/symptoms of preeclampsia in second/third trimester and postpartum period       F/u in 2 weeks for cervical length only  F/u in 4 weeks for MFM visit /growth ultrasound    Jaqueline Zuniga MD  Maternal Fetal Medicine

## 2025-07-21 ENCOUNTER — OFFICE VISIT (OUTPATIENT)
Dept: FAMILY MEDICINE | Facility: CLINIC | Age: 30
End: 2025-07-21
Payer: MEDICAID

## 2025-07-21 VITALS
SYSTOLIC BLOOD PRESSURE: 108 MMHG | BODY MASS INDEX: 29.37 KG/M2 | WEIGHT: 193.81 LBS | DIASTOLIC BLOOD PRESSURE: 72 MMHG | TEMPERATURE: 99 F | HEART RATE: 92 BPM | HEIGHT: 68 IN | OXYGEN SATURATION: 99 %

## 2025-07-21 DIAGNOSIS — O09.899 SUSCEPTIBLE TO VARICELLA (NON-IMMUNE), CURRENTLY PREGNANT: ICD-10-CM

## 2025-07-21 DIAGNOSIS — G93.31 POST VIRAL SYNDROME: ICD-10-CM

## 2025-07-21 DIAGNOSIS — Z28.39 SUSCEPTIBLE TO VARICELLA (NON-IMMUNE), CURRENTLY PREGNANT: ICD-10-CM

## 2025-07-21 DIAGNOSIS — O09.299 HISTORY OF PRE-ECLAMPSIA IN PRIOR PREGNANCY, CURRENTLY PREGNANT: ICD-10-CM

## 2025-07-21 DIAGNOSIS — Z87.59 HISTORY OF GESTATIONAL HYPERTENSION: ICD-10-CM

## 2025-07-21 DIAGNOSIS — Z3A.21 21 WEEKS GESTATION OF PREGNANCY: Primary | ICD-10-CM

## 2025-07-21 DIAGNOSIS — Z87.59 HISTORY OF PRIOR PREGNANCY WITH IUGR NEWBORN: ICD-10-CM

## 2025-07-21 LAB
BILIRUB SERPL-MCNC: NEGATIVE MG/DL
BLOOD URINE, POC: NEGATIVE
CLARITY, POC UA: CLEAR
COLOR, POC UA: YELLOW
GLUCOSE UR QL STRIP: NEGATIVE
KETONES UR QL STRIP: NEGATIVE
LEUKOCYTE ESTERASE URINE, POC: NORMAL
NITRITE, POC UA: NEGATIVE
PH, POC UA: 7
PROTEIN, POC: NEGATIVE
SPECIFIC GRAVITY, POC UA: 1.01
UROBILINOGEN, POC UA: 0.2

## 2025-07-21 PROCEDURE — 81002 URINALYSIS NONAUTO W/O SCOPE: CPT | Mod: PBBFAC

## 2025-07-21 PROCEDURE — 99213 OFFICE O/P EST LOW 20 MIN: CPT | Mod: PBBFAC

## 2025-07-21 RX ORDER — ASPIRIN 81 MG/1
81 TABLET ORAL DAILY
Qty: 90 TABLET | Refills: 1 | Status: SHIPPED | OUTPATIENT
Start: 2025-07-21 | End: 2026-01-17

## 2025-07-21 NOTE — PROGRESS NOTES
[facility] OB OFFICE VISIT NOTE     Primary PCP:     Chief Complaint     Salome Pérez is a 29 y.o. female   21w5d TONNY 2025, by Ultrasound presenting to [facility] for routine OB.    HPI: Having persistent cough and congestion x2 weeks since family trip. Went to  on 2025, had strep and COVID testing at that time and negative. No fever or chills. Reports symptoms are overall improving.     Relevant PMH:   - History of GHTN and toxemia/pre-E not requiring pharmacological management   - History of  delivery   - History of prior pregnancy with IUGR     Obstetrics History     OB History    Para Term  AB Living   2 1  1  1   SAB IAB Ectopic Multiple Live Births       1      # Outcome Date GA Lbr Matthew/2nd Weight Sex Type Anes PTL Lv   2 Current            1  21 35w0d  2.013 kg (4 lb 7 oz) M Vag-Spont  Y XIN      Complications: Toxemia in pregnancy, Hypertensive disease arising during pregnancy       Gynecology History   - LMP: 2025, exact date unknown  - Age at menarche: 13 years  - Menstrual hx: regular, heavy, 5-7 pads/day, 7-9 days per period  - History of birth control: Denies  - History of STDs and/or Abnormal PAPs: Denies  - History of prior : No    Medical History    Past Medical History: Denies any pertinent medical histoyr. GHTN and pre-E in previous pregnancy, History of  delivery   Surgical History: Denies  Family History: Father -- DM, HTN; MGM -- Breast cancer  Social History: History of vaping, but since finding out she was pregnancy at quit. Lives at home with babies father and son. Good support system   Medications: PNV, ASA 81mg daily     Review of Systems   Review of Systems  Antepartum specific ROS  -  Fetal movements: Yes, flutters  - Vaginal bleeding: No  - Vaginal discharge: No  - Loss of fluid: No  - Contractions: No  - Headaches: No  - Vision changes: No  - Edema: Yes, improves with elevation    Vital Signs     Vitals:  "   25 0949   BP: 108/72   BP Location: Right arm   Patient Position: Sitting   Pulse: 92   Temp: 98.7 °F (37.1 °C)   TempSrc: Oral   SpO2: 99%   Weight: 87.9 kg (193 lb 12.8 oz)   Height: 5' 8" (1.727 m)       Physical Exam   General: in no acute distress  RESP: clear to auscultation bilaterally, non labored  CV: regular rate and rhythm, no murmurs, no edema  ABD: non-tender, BS+  FHTs: 140s bpm via Doppler  Fundal height: At umbilicus    Current Medications:      Current Outpatient Medications   Medication Instructions    aspirin (ECOTRIN) 81 mg, Oral, Daily    prenatal vit no.124/iron/folic (PRENATAL VITAMIN ORAL) Take by mouth.       Labs   Urine dipstick:   Lab Results   Component Value Date    COLORU Yellow 2025    SPECGRAV 1.015 2025    PHUR 7.0 2025    WBCUR small 2025    NITRITE negative 2025    PROTEINPOC negative 2025    GLUCOSEUR negative 2025    KETONESU negative 2025    UROBILINOGEN 0.2 2025    BILIRUBINPOC negative 2025    RBCUR negative 2025       Initial OB Labs: (Date 25)  - Blood Type and Rh: O POS  - Antibody Screen: NEG  - CBC H/H: 10.7/32.8  - BUN/Cr: 6/0.61  - HIV: NR  - RPR: NR  - GC: ND  - CT: ND  - HBsAg: NR   - HCVAb: NR  - Rubella: Immune  - Varicella: Non-immune  - UA & Culture: trace blood, 500 LE, 11-20 RBC, trace bacteria; no growth on urine culture   - Sickle Cell Screen: Negative   - PAP: NIL  - Influenza vaccine date: N/A   - Previous  (N/A if not applicable): N/A  - BTL desired (N/A if not applicable): N/A  - Aspirin: initiated 25  hx of htn in previous pregnancy and hx of preeclampsia in previous pregnancy  Black race    15-20 Weeks Lab: (Date ordered 25)  - Quad Screen: normal risk    28 Week Labs: (Date)  - 1H GTT:   - Rhogam (N/A if not applicable):   - Date of Tdap:   - CBC H/H:   - RPR:   -  Consent Form Signed (N/A if not applicable):   - BTL consent:   - [facility] for pediatric " care:     37 Week Labs: (Date)  - CBC H/H:   - RPR:   - GBS Culture:    - HIV:   - Cervical GC:   - Cervical Exam:     38 Weeks:  -Cervical Exam:     39 Weeks:  -Cervical Exam:  -NST Exam:    FM Continuity Patient: no  Previous : no    Scheduled delivery: induction or  (can NOT be scheduled any earlier than 39w0d):    Imaging    Initial US: 25  Impression:     Single viable intrauterine pregnancy with a crown-rump length indicative of a 14 week 2 day gestation     Anatomy Scan: 25  Impression   =========   A noriega living IUP is identified, and the biometry is appropriate for established gestational age. Early, limited anatomy appears normal. The placenta is anterior.   Transvaginal cervical length measures 3.6cm.     Recommendation   ==============   Suggest repeat scan in 2 weeks for cervical length.     US 2025:  A noriega living IUP is identified.   Fetal size is appropriate for established dating.   No fetal structural malformations are identified; however, the anatomic survey is incomplete as noted above. The patient will be scheduled for a f/u exam to complete the   anatomic survey.   Transvaginal cervical length is reassurring indicating decreased risk for PTD.   Placental location is posterior without evidence of previa.     Assessment     1. 21 weeks gestation of pregnancy    2. History of gestational hypertension    3. History of pre-eclampsia in prior pregnancy, currently pregnant    4. - History of prior pregnancy with IUGR     5. Susceptible to varicella (non-immune), currently pregnant    6. Post viral syndrome        Plan   - OB Protocol discussed  - PNVs  - Urine dip reviewed as above  - Routine labs: none  - Mother plans to breast feed  - Postpartum contraception discussion: none  - ED precautions discussed: vaginal bleeding or leaking fluid, belly cramping or pain, SOB/chest pain, swelling of the face/lower extremities, vision changes. If don't feel the  baby move in over an hour. Severe headache that are not resolved with medication     History of  delivery, currently pregnant  - Cervical length surveillance every 2 weeks between 16 weeks and 22w6d with MFM; may need to start vaginal progesterone if cervical length shortening is encountered.      History of prior pregnancy with IUGR   - Fetal growth US with MFM    Varicella non-immune  - Will need vaccination post partum    Post viral syndrome  - Persistent cough  - Counseled patient may consider trial of OTC Mucinex to aid with cough  - Counseled to refer to information sheet on safe medications in pregnancy when taking OTC cough/cold medications      Return to clinic in 4 weeks       Bola Moody MD  [id number]

## 2025-07-28 ENCOUNTER — PROCEDURE VISIT (OUTPATIENT)
Dept: MATERNAL FETAL MEDICINE | Facility: CLINIC | Age: 30
End: 2025-07-28
Payer: MEDICAID

## 2025-07-28 DIAGNOSIS — O09.899 HISTORY OF PRETERM DELIVERY, CURRENTLY PREGNANT: ICD-10-CM

## 2025-07-28 PROCEDURE — 76817 TRANSVAGINAL US OBSTETRIC: CPT | Mod: S$GLB,,, | Performed by: OBSTETRICS & GYNECOLOGY

## 2025-08-11 ENCOUNTER — PROCEDURE VISIT (OUTPATIENT)
Dept: MATERNAL FETAL MEDICINE | Facility: CLINIC | Age: 30
End: 2025-08-11
Payer: MEDICAID

## 2025-08-11 ENCOUNTER — OFFICE VISIT (OUTPATIENT)
Dept: MATERNAL FETAL MEDICINE | Facility: CLINIC | Age: 30
End: 2025-08-11
Payer: MEDICAID

## 2025-08-11 VITALS
DIASTOLIC BLOOD PRESSURE: 79 MMHG | HEIGHT: 68 IN | HEART RATE: 105 BPM | WEIGHT: 192.44 LBS | BODY MASS INDEX: 29.17 KG/M2 | SYSTOLIC BLOOD PRESSURE: 124 MMHG

## 2025-08-11 DIAGNOSIS — Z87.59 HISTORY OF GESTATIONAL HYPERTENSION: Primary | ICD-10-CM

## 2025-08-11 DIAGNOSIS — O09.899 HISTORY OF PRETERM DELIVERY, CURRENTLY PREGNANT: ICD-10-CM

## 2025-08-11 DIAGNOSIS — Z87.59 HISTORY OF GESTATIONAL HYPERTENSION: ICD-10-CM

## 2025-08-11 DIAGNOSIS — Z3A.21 21 WEEKS GESTATION OF PREGNANCY: ICD-10-CM

## 2025-08-11 DIAGNOSIS — Z87.59 HISTORY OF PRIOR PREGNANCY WITH IUGR NEWBORN: ICD-10-CM

## 2025-08-11 PROCEDURE — 1160F RVW MEDS BY RX/DR IN RCRD: CPT | Mod: CPTII,S$GLB,, | Performed by: OBSTETRICS & GYNECOLOGY

## 2025-08-11 PROCEDURE — 3074F SYST BP LT 130 MM HG: CPT | Mod: CPTII,S$GLB,, | Performed by: OBSTETRICS & GYNECOLOGY

## 2025-08-11 PROCEDURE — 3078F DIAST BP <80 MM HG: CPT | Mod: CPTII,S$GLB,, | Performed by: OBSTETRICS & GYNECOLOGY

## 2025-08-11 PROCEDURE — 1159F MED LIST DOCD IN RCRD: CPT | Mod: CPTII,S$GLB,, | Performed by: OBSTETRICS & GYNECOLOGY

## 2025-08-11 PROCEDURE — 3008F BODY MASS INDEX DOCD: CPT | Mod: CPTII,S$GLB,, | Performed by: OBSTETRICS & GYNECOLOGY

## 2025-08-11 PROCEDURE — 99213 OFFICE O/P EST LOW 20 MIN: CPT | Mod: TH,25,S$GLB, | Performed by: OBSTETRICS & GYNECOLOGY

## 2025-08-11 PROCEDURE — 76816 OB US FOLLOW-UP PER FETUS: CPT | Mod: S$GLB,,, | Performed by: OBSTETRICS & GYNECOLOGY

## 2025-08-11 RX ORDER — ASPIRIN 81 MG/1
81 TABLET ORAL DAILY
Qty: 90 TABLET | Refills: 1 | Status: SHIPPED | OUTPATIENT
Start: 2025-08-11

## 2025-08-12 DIAGNOSIS — O09.899 HISTORY OF PRETERM DELIVERY, CURRENTLY PREGNANT: ICD-10-CM

## 2025-08-12 DIAGNOSIS — Z87.59 HISTORY OF PRIOR PREGNANCY WITH IUGR NEWBORN: ICD-10-CM

## 2025-08-12 DIAGNOSIS — Z87.59 HISTORY OF GESTATIONAL HYPERTENSION: Primary | ICD-10-CM

## 2025-08-19 ENCOUNTER — OFFICE VISIT (OUTPATIENT)
Dept: FAMILY MEDICINE | Facility: CLINIC | Age: 30
End: 2025-08-19
Payer: MEDICAID

## 2025-08-19 DIAGNOSIS — Z28.39 SUSCEPTIBLE TO VARICELLA (NON-IMMUNE), CURRENTLY PREGNANT: ICD-10-CM

## 2025-08-19 DIAGNOSIS — Z87.59 HISTORY OF PRIOR PREGNANCY WITH IUGR NEWBORN: ICD-10-CM

## 2025-08-19 DIAGNOSIS — O09.299 HISTORY OF PRE-ECLAMPSIA IN PRIOR PREGNANCY, CURRENTLY PREGNANT: ICD-10-CM

## 2025-08-19 DIAGNOSIS — Z87.59 HISTORY OF GESTATIONAL HYPERTENSION: ICD-10-CM

## 2025-08-19 DIAGNOSIS — O09.899 SUSCEPTIBLE TO VARICELLA (NON-IMMUNE), CURRENTLY PREGNANT: ICD-10-CM

## 2025-08-19 DIAGNOSIS — O09.899 HISTORY OF PRETERM DELIVERY, CURRENTLY PREGNANT: ICD-10-CM

## 2025-08-19 DIAGNOSIS — Z3A.25 25 WEEKS GESTATION OF PREGNANCY: Primary | ICD-10-CM

## 2025-08-19 LAB
BILIRUB SERPL-MCNC: NORMAL MG/DL
BLOOD URINE, POC: NORMAL
CLARITY, POC UA: CLEAR
COLOR, POC UA: YELLOW
GLUCOSE UR QL STRIP: NORMAL
KETONES UR QL STRIP: NORMAL
LEUKOCYTE ESTERASE URINE, POC: NORMAL
NITRITE, POC UA: NORMAL
PH, POC UA: 7
PROTEIN, POC: NORMAL
SPECIFIC GRAVITY, POC UA: 1.02
UROBILINOGEN, POC UA: 0.2

## 2025-08-19 PROCEDURE — 87086 URINE CULTURE/COLONY COUNT: CPT

## 2025-08-19 PROCEDURE — 99213 OFFICE O/P EST LOW 20 MIN: CPT | Mod: PBBFAC

## 2025-08-22 LAB — BACTERIA UR CULT: NO GROWTH

## 2025-09-02 ENCOUNTER — OFFICE VISIT (OUTPATIENT)
Dept: FAMILY MEDICINE | Facility: CLINIC | Age: 30
End: 2025-09-02
Payer: MEDICAID

## 2025-09-02 VITALS
RESPIRATION RATE: 18 BRPM | OXYGEN SATURATION: 100 % | HEIGHT: 68 IN | HEART RATE: 91 BPM | WEIGHT: 197.19 LBS | SYSTOLIC BLOOD PRESSURE: 102 MMHG | BODY MASS INDEX: 29.88 KG/M2 | DIASTOLIC BLOOD PRESSURE: 70 MMHG | TEMPERATURE: 97 F

## 2025-09-02 DIAGNOSIS — Z3A.28 28 WEEKS GESTATION OF PREGNANCY: Primary | ICD-10-CM

## 2025-09-02 LAB
BILIRUB SERPL-MCNC: NORMAL MG/DL
BLOOD URINE, POC: NORMAL
CLARITY, POC UA: NORMAL
COLOR, POC UA: YELLOW
GLUCOSE P FAST SERPL-MCNC: 113 MG/DL (ref 70–100)
GLUCOSE UR QL STRIP: NORMAL
HCT VFR BLD AUTO: 29.6 % (ref 37–47)
HGB BLD-MCNC: 9.8 G/DL (ref 12–16)
KETONES UR QL STRIP: NORMAL
LEUKOCYTE ESTERASE URINE, POC: NORMAL
NITRITE, POC UA: NORMAL
PH, POC UA: 7
PROTEIN, POC: NORMAL
SPECIFIC GRAVITY, POC UA: 1.02
UROBILINOGEN, POC UA: 0.2

## 2025-09-02 PROCEDURE — 81002 URINALYSIS NONAUTO W/O SCOPE: CPT | Mod: PBBFAC

## 2025-09-02 PROCEDURE — 85018 HEMOGLOBIN: CPT

## 2025-09-02 PROCEDURE — 82947 ASSAY GLUCOSE BLOOD QUANT: CPT

## 2025-09-02 PROCEDURE — 99213 OFFICE O/P EST LOW 20 MIN: CPT | Mod: PBBFAC

## 2025-09-02 RX ORDER — FAMOTIDINE 20 MG/1
20 TABLET, FILM COATED ORAL DAILY
COMMUNITY